# Patient Record
Sex: FEMALE | Race: WHITE | NOT HISPANIC OR LATINO | Employment: OTHER | URBAN - NONMETROPOLITAN AREA
[De-identification: names, ages, dates, MRNs, and addresses within clinical notes are randomized per-mention and may not be internally consistent; named-entity substitution may affect disease eponyms.]

---

## 2022-08-04 ENCOUNTER — APPOINTMENT (OUTPATIENT)
Dept: CT IMAGING | Facility: HOSPITAL | Age: 72
End: 2022-08-04

## 2022-08-04 ENCOUNTER — HOSPITAL ENCOUNTER (OUTPATIENT)
Facility: HOSPITAL | Age: 72
Discharge: HOME | End: 2022-08-05
Attending: EMERGENCY MEDICINE | Admitting: SURGERY

## 2022-08-04 DIAGNOSIS — K62.5 BRIGHT RED RECTAL BLEEDING: Primary | ICD-10-CM

## 2022-08-04 LAB
ABO GROUP BLD: NORMAL
ABO GROUP BLD: NORMAL
ALBUMIN SERPL-MCNC: 4 G/DL (ref 3.5–5.2)
ALBUMIN/GLOB SERPL: 1.4 G/DL
ALP SERPL-CCNC: 93 U/L (ref 39–117)
ALT SERPL W P-5'-P-CCNC: <5 U/L (ref 1–33)
ANION GAP SERPL CALCULATED.3IONS-SCNC: 12.6 MMOL/L (ref 5–15)
APTT PPP: 29.3 SECONDS (ref 70–100)
AST SERPL-CCNC: 10 U/L (ref 1–32)
BASOPHILS # BLD AUTO: 0.05 10*3/MM3 (ref 0–0.2)
BASOPHILS NFR BLD AUTO: 0.6 % (ref 0–1.5)
BILIRUB SERPL-MCNC: 0.2 MG/DL (ref 0–1.2)
BILIRUB UR QL STRIP: NEGATIVE
BLD GP AB SCN SERPL QL: NEGATIVE
BUN SERPL-MCNC: 39 MG/DL (ref 8–23)
BUN/CREAT SERPL: 13.3 (ref 7–25)
CALCIUM SPEC-SCNC: 9.1 MG/DL (ref 8.6–10.5)
CHLORIDE SERPL-SCNC: 102 MMOL/L (ref 98–107)
CLARITY UR: CLEAR
CO2 SERPL-SCNC: 22.4 MMOL/L (ref 22–29)
COLOR UR: YELLOW
CREAT SERPL-MCNC: 2.94 MG/DL (ref 0.57–1)
D-LACTATE SERPL-SCNC: 1 MMOL/L (ref 0.5–2)
DEPRECATED RDW RBC AUTO: 45.9 FL (ref 37–54)
EGFRCR SERPLBLD CKD-EPI 2021: 16.5 ML/MIN/1.73
EOSINOPHIL # BLD AUTO: 0.76 10*3/MM3 (ref 0–0.4)
EOSINOPHIL NFR BLD AUTO: 9.2 % (ref 0.3–6.2)
ERYTHROCYTE [DISTWIDTH] IN BLOOD BY AUTOMATED COUNT: 13.6 % (ref 12.3–15.4)
GLOBULIN UR ELPH-MCNC: 2.8 GM/DL
GLUCOSE BLDC GLUCOMTR-MCNC: 104 MG/DL (ref 70–130)
GLUCOSE BLDC GLUCOMTR-MCNC: 108 MG/DL (ref 70–130)
GLUCOSE SERPL-MCNC: 146 MG/DL (ref 65–99)
GLUCOSE UR STRIP-MCNC: NEGATIVE MG/DL
HCT VFR BLD AUTO: 35.3 % (ref 34–46.6)
HGB BLD-MCNC: 10.6 G/DL (ref 12–15.9)
HGB UR QL STRIP.AUTO: NEGATIVE
HOLD SPECIMEN: NORMAL
HOLD SPECIMEN: NORMAL
IMM GRANULOCYTES # BLD AUTO: 0.04 10*3/MM3 (ref 0–0.05)
IMM GRANULOCYTES NFR BLD AUTO: 0.5 % (ref 0–0.5)
INR PPP: 0.99 (ref 0.9–1.1)
KETONES UR QL STRIP: NEGATIVE
LEUKOCYTE ESTERASE UR QL STRIP.AUTO: NEGATIVE
LIPASE SERPL-CCNC: 47 U/L (ref 13–60)
LYMPHOCYTES # BLD AUTO: 2.72 10*3/MM3 (ref 0.7–3.1)
LYMPHOCYTES NFR BLD AUTO: 32.9 % (ref 19.6–45.3)
MCH RBC QN AUTO: 27.7 PG (ref 26.6–33)
MCHC RBC AUTO-ENTMCNC: 30 G/DL (ref 31.5–35.7)
MCV RBC AUTO: 92.2 FL (ref 79–97)
MONOCYTES # BLD AUTO: 0.77 10*3/MM3 (ref 0.1–0.9)
MONOCYTES NFR BLD AUTO: 9.3 % (ref 5–12)
NEUTROPHILS NFR BLD AUTO: 3.94 10*3/MM3 (ref 1.7–7)
NEUTROPHILS NFR BLD AUTO: 47.5 % (ref 42.7–76)
NITRITE UR QL STRIP: NEGATIVE
NRBC BLD AUTO-RTO: 0 /100 WBC (ref 0–0.2)
PH UR STRIP.AUTO: 7 [PH] (ref 5–8)
PLATELET # BLD AUTO: 175 10*3/MM3 (ref 140–450)
PMV BLD AUTO: 10.4 FL (ref 6–12)
POTASSIUM SERPL-SCNC: 4.9 MMOL/L (ref 3.5–5.2)
PROT SERPL-MCNC: 6.8 G/DL (ref 6–8.5)
PROT UR QL STRIP: NEGATIVE
PROTHROMBIN TIME: 13.4 SECONDS (ref 12.5–14.5)
RBC # BLD AUTO: 3.83 10*6/MM3 (ref 3.77–5.28)
RH BLD: POSITIVE
RH BLD: POSITIVE
SARS-COV-2 RNA PNL SPEC NAA+PROBE: NOT DETECTED
SODIUM SERPL-SCNC: 137 MMOL/L (ref 136–145)
SP GR UR STRIP: 1.01 (ref 1–1.03)
T&S EXPIRATION DATE: NORMAL
UROBILINOGEN UR QL STRIP: NORMAL
WBC NRBC COR # BLD: 8.28 10*3/MM3 (ref 3.4–10.8)
WHOLE BLOOD HOLD COAG: NORMAL
WHOLE BLOOD HOLD SPECIMEN: NORMAL

## 2022-08-04 PROCEDURE — 86900 BLOOD TYPING SEROLOGIC ABO: CPT

## 2022-08-04 PROCEDURE — 99284 EMERGENCY DEPT VISIT MOD MDM: CPT

## 2022-08-04 PROCEDURE — 80053 COMPREHEN METABOLIC PANEL: CPT | Performed by: EMERGENCY MEDICINE

## 2022-08-04 PROCEDURE — 82962 GLUCOSE BLOOD TEST: CPT

## 2022-08-04 PROCEDURE — 86901 BLOOD TYPING SEROLOGIC RH(D): CPT

## 2022-08-04 PROCEDURE — 86901 BLOOD TYPING SEROLOGIC RH(D): CPT | Performed by: NURSE PRACTITIONER

## 2022-08-04 PROCEDURE — 87635 SARS-COV-2 COVID-19 AMP PRB: CPT | Performed by: PHYSICIAN ASSISTANT

## 2022-08-04 PROCEDURE — 81003 URINALYSIS AUTO W/O SCOPE: CPT | Performed by: EMERGENCY MEDICINE

## 2022-08-04 PROCEDURE — 83605 ASSAY OF LACTIC ACID: CPT | Performed by: EMERGENCY MEDICINE

## 2022-08-04 PROCEDURE — 85730 THROMBOPLASTIN TIME PARTIAL: CPT | Performed by: PHYSICIAN ASSISTANT

## 2022-08-04 PROCEDURE — G0378 HOSPITAL OBSERVATION PER HR: HCPCS

## 2022-08-04 PROCEDURE — 93005 ELECTROCARDIOGRAM TRACING: CPT | Performed by: PHYSICIAN ASSISTANT

## 2022-08-04 PROCEDURE — 99204 OFFICE O/P NEW MOD 45 MIN: CPT | Performed by: SURGERY

## 2022-08-04 PROCEDURE — 83690 ASSAY OF LIPASE: CPT | Performed by: EMERGENCY MEDICINE

## 2022-08-04 PROCEDURE — 86850 RBC ANTIBODY SCREEN: CPT | Performed by: NURSE PRACTITIONER

## 2022-08-04 PROCEDURE — 85610 PROTHROMBIN TIME: CPT | Performed by: PHYSICIAN ASSISTANT

## 2022-08-04 PROCEDURE — C9803 HOPD COVID-19 SPEC COLLECT: HCPCS

## 2022-08-04 PROCEDURE — 85025 COMPLETE CBC W/AUTO DIFF WBC: CPT | Performed by: EMERGENCY MEDICINE

## 2022-08-04 PROCEDURE — 86900 BLOOD TYPING SEROLOGIC ABO: CPT | Performed by: NURSE PRACTITIONER

## 2022-08-04 PROCEDURE — 74176 CT ABD & PELVIS W/O CONTRAST: CPT

## 2022-08-04 PROCEDURE — 99218 PR INITIAL OBSERVATION CARE/DAY 30 MINUTES: CPT | Performed by: NURSE PRACTITIONER

## 2022-08-04 RX ORDER — ONDANSETRON 4 MG/1
4 TABLET, FILM COATED ORAL EVERY 6 HOURS PRN
Status: DISCONTINUED | OUTPATIENT
Start: 2022-08-04 | End: 2022-08-05 | Stop reason: HOSPADM

## 2022-08-04 RX ORDER — POLYETHYLENE GLYCOL 3350 17 G/17G
1 POWDER, FOR SOLUTION ORAL ONCE
Status: COMPLETED | OUTPATIENT
Start: 2022-08-04 | End: 2022-08-04

## 2022-08-04 RX ORDER — ONDANSETRON 2 MG/ML
4 INJECTION INTRAMUSCULAR; INTRAVENOUS EVERY 6 HOURS PRN
Status: DISCONTINUED | OUTPATIENT
Start: 2022-08-04 | End: 2022-08-05 | Stop reason: HOSPADM

## 2022-08-04 RX ORDER — DICYCLOMINE HYDROCHLORIDE 10 MG/1
10 CAPSULE ORAL
COMMUNITY

## 2022-08-04 RX ORDER — NICOTINE POLACRILEX 4 MG
15 LOZENGE BUCCAL
Status: DISCONTINUED | OUTPATIENT
Start: 2022-08-04 | End: 2022-08-05 | Stop reason: HOSPADM

## 2022-08-04 RX ORDER — SODIUM CHLORIDE 0.9 % (FLUSH) 0.9 %
10 SYRINGE (ML) INJECTION EVERY 12 HOURS SCHEDULED
Status: DISCONTINUED | OUTPATIENT
Start: 2022-08-04 | End: 2022-08-05 | Stop reason: HOSPADM

## 2022-08-04 RX ORDER — PANTOPRAZOLE SODIUM 40 MG/10ML
40 INJECTION, POWDER, LYOPHILIZED, FOR SOLUTION INTRAVENOUS
Status: DISCONTINUED | OUTPATIENT
Start: 2022-08-05 | End: 2022-08-05 | Stop reason: HOSPADM

## 2022-08-04 RX ORDER — LOPERAMIDE HYDROCHLORIDE 2 MG/1
2 CAPSULE ORAL 4 TIMES DAILY PRN
COMMUNITY

## 2022-08-04 RX ORDER — ATORVASTATIN CALCIUM 20 MG/1
20 TABLET, FILM COATED ORAL NIGHTLY
COMMUNITY

## 2022-08-04 RX ORDER — CHOLECALCIFEROL (VITAMIN D3) 125 MCG
5 CAPSULE ORAL NIGHTLY PRN
Status: DISCONTINUED | OUTPATIENT
Start: 2022-08-04 | End: 2022-08-05 | Stop reason: HOSPADM

## 2022-08-04 RX ORDER — CALCITRIOL 0.25 UG/1
0.25 CAPSULE, LIQUID FILLED ORAL DAILY
COMMUNITY

## 2022-08-04 RX ORDER — FUROSEMIDE 40 MG/1
40 TABLET ORAL DAILY
COMMUNITY

## 2022-08-04 RX ORDER — ACETAMINOPHEN 325 MG/1
650 TABLET ORAL EVERY 4 HOURS PRN
Status: DISCONTINUED | OUTPATIENT
Start: 2022-08-04 | End: 2022-08-05 | Stop reason: HOSPADM

## 2022-08-04 RX ORDER — SODIUM CHLORIDE 0.9 % (FLUSH) 0.9 %
10 SYRINGE (ML) INJECTION AS NEEDED
Status: DISCONTINUED | OUTPATIENT
Start: 2022-08-04 | End: 2022-08-05 | Stop reason: HOSPADM

## 2022-08-04 RX ORDER — INSULIN ASPART 100 [IU]/ML
0-14 INJECTION, SOLUTION INTRAVENOUS; SUBCUTANEOUS
Status: DISCONTINUED | OUTPATIENT
Start: 2022-08-04 | End: 2022-08-05 | Stop reason: HOSPADM

## 2022-08-04 RX ORDER — SIMETHICONE 125 MG
125 TABLET,CHEWABLE ORAL EVERY 6 HOURS PRN
COMMUNITY

## 2022-08-04 RX ORDER — CARVEDILOL 6.25 MG/1
6.25 TABLET ORAL 2 TIMES DAILY WITH MEALS
COMMUNITY

## 2022-08-04 RX ORDER — CITALOPRAM 40 MG/1
40 TABLET ORAL EVERY MORNING
COMMUNITY

## 2022-08-04 RX ORDER — DEXTROSE MONOHYDRATE 25 G/50ML
25 INJECTION, SOLUTION INTRAVENOUS
Status: DISCONTINUED | OUTPATIENT
Start: 2022-08-04 | End: 2022-08-05 | Stop reason: HOSPADM

## 2022-08-04 RX ORDER — HYDROXYZINE HYDROCHLORIDE 25 MG/1
25 TABLET, FILM COATED ORAL 2 TIMES DAILY PRN
COMMUNITY

## 2022-08-04 RX ORDER — OMEPRAZOLE 20 MG/1
20 CAPSULE, DELAYED RELEASE ORAL DAILY
COMMUNITY

## 2022-08-04 RX ADMIN — Medication 10 ML: at 21:28

## 2022-08-04 RX ADMIN — SODIUM CHLORIDE 500 ML: 9 INJECTION, SOLUTION INTRAVENOUS at 13:07

## 2022-08-04 RX ADMIN — ACETAMINOPHEN 650 MG: 325 TABLET ORAL at 18:56

## 2022-08-04 RX ADMIN — POLYETHYLENE GLYCOL 3350 1 BOTTLE: 17 POWDER, FOR SOLUTION ORAL at 21:26

## 2022-08-04 NOTE — ED PROVIDER NOTES
Subjective   Patient is a 72-year-old female with history of CHF, COPD, diabetes, hypertension, renal disorder, Parkinson's disease, and stroke on Plavix presenting to the ER for evaluation of rectal bleeding.  Patient reported she lives in [place] with her son but is visiting her daughter here in [place].  She states last night she began having bright red bleeding from her rectum.  She states that she has had some diffuse low back pain and cramping that feels like menstrual cramping in her lower stomach.  She states that she does believe she is on blood thinner, does not recall the name but spoke with patient's son/caregiver via telephone and he believed it was Plavix.  He states that she last had a colonoscopy approximately 1 year ago, had some polyps removed but no other abnormal findings. Daughter thought she had a colonoscopy earlier this year. Patient states she does take medicine to help with her bowel movements.  She somtimes has loose diarrhea and other times she will have hard balls of stool.  She states she does have a history of hemorrhoids.  She denies any dizziness, fever, chills, cough, shortness of breath, emesis, hematemesis, dysuria, hematuria, or any other symptoms.          Review of Systems   Constitutional: Negative for chills and fever.   HENT: Negative.    Eyes: Negative.    Respiratory: Negative.    Cardiovascular: Negative.    Gastrointestinal: Positive for abdominal pain and anal bleeding. Negative for nausea and vomiting.   Genitourinary: Negative.    Musculoskeletal: Negative.    Skin: Negative.    Neurological: Negative.    Psychiatric/Behavioral: Negative.        Past Medical History:   Diagnosis Date   • CHF (congestive heart failure) (HCC)    • COPD (chronic obstructive pulmonary disease) (HCC)    • Diabetes mellitus (HCC)    • Hypertension    • Renal disorder    • Stroke (HCC)        No Known Allergies    Past Surgical History:   Procedure Laterality Date   • COLONOSCOPY     •  "HYSTERECTOMY         History reviewed. No pertinent family history.    Social History     Socioeconomic History   • Marital status: Single   Tobacco Use   • Smoking status: Never Smoker   • Smokeless tobacco: Never Used   Vaping Use   • Vaping Use: Never used           Objective   Physical Exam  Vitals and nursing note reviewed.     /70   Pulse 62   Temp 97.8 °F (36.6 °C) (Oral)   Resp 18   Ht 152.4 cm (60\")   Wt 81.6 kg (180 lb)   SpO2 98%   BMI 35.15 kg/m²     GEN: No acute distress, sitting upright in stretcher.  Awake and alert.  Does not appear septic or toxic.  She is answering questions appropriately.  Head: Normocephalic, atraumatic  Eyes: EOM intact  ENT: Mask in place per protocol  Chest: Nontender to palpation  Cardiovascular: Regular rate and rhythm  Lungs: Clear to auscultation bilaterally without adventitious sounds  Abdomen: Nondistended.  Bowel sounds present.  Patient does have some tenderness in her lower abdomen with no specific focal guarding or rigidity.  Patient has bright red blood and clots in her briefs.  No external tears or lacerations.  No specific pain on rectal exam.  Extremities: No edema, normal appearance, full range of motion without deficits  Neuro: GCS 15  Psych: Mood and affect are appropriate    Procedures           ED Course  ED Course as of 08/04/22 1511   Thu Aug 04, 2022   1233 WBC: 8.28 [LA]   1233 Hemoglobin(!): 10.6 [LA]   1233 Platelets: 175 [LA]   1233 Lactate: 1.0 [LA]   1244 Glucose(!): 146 [LA]   1252 BUN(!): 39 [LA]   1252 Creatinine(!): 2.94 [LA]   1252 Sodium: 137 [LA]   1252 Potassium: 4.9 [LA]   1252 Chloride: 102 [LA]   1252 CO2: 22.4 [LA]   1252 Calcium: 9.1 [LA]   1252 Total Protein: 6.8 [LA]   1252 Albumin: 4.00 [LA]   1252 ALT (SGPT): <5 [LA]   1252 AST (SGOT): 10 [LA]   1252 Alkaline Phosphatase: 93 [LA]   1252 Total Bilirubin: 0.2 [LA]   1252 Globulin: 2.8 [LA]   1252 A/G Ratio: 1.4 [LA]   1252 BUN/Creatinine Ratio: 13.3 [LA]   1252 Anion " Gap: 12.6 [LA]   1252 eGFR(!): 16.5 [LA]   1252 Patient's creatinine appears similar to previous.  Her hemoglobin was last checked 4 months ago in our system 12.4 at that time. [LA]   1258 Patient in bathroom at this time. [LA]   1259 Protime: 13.4 [LA]   1259 INR: 0.99 [LA]   1259 PTT(!): 29.3 [LA]   1337 Narrative & Impression  CT of the abdomen and pelvis without contrast     INDICATION: Rectal bleeding, lower back pain     COMPARISON: None     TECHNIQUE: Helically acquired axial multidetector CT images were  obtained from the lung bases through the pelvis without IV contrast.  Dose reduction techniques to achieve ALARA were employed.      Findings:     Lung bases: [No focal pneumonia]. Mild cardiomegaly. Multivessel  coronary artery calcifications.     Liver: [Grossly unremarkable]     Spleen: [Unremarkable]     Gallbladder/biliary tree: [ No biliary ductal dilatation].  Cholecystectomy.     Pancreas: [Grossly unremarkable].     Adrenals: [Unremarkable]     Kidneys: [Punctate 2 to 3 mm nonobstructing left renal calculi. No  hydronephrosis].     GI: [No bowel obstruction]. Redundant colonic loops with extensive  colonic diverticulosis. No acute diverticulitis     Bladder: [Unremarkable].     Bones: [No significant osseous abnormalities are identified].     Soft Tissues: {unremarkable]. Small fat-containing right inguinal  hernia.     IMPRESSION:  No acute abdominopelvic pathology. Redundant colonic loops with  extensive colonic diverticulosis.     This report was signed and finalized on 8/4/2022 1:32 PM by Navdeep Calderon MD. [LA]   1353 Discussed with Dr. Christensen, he states he will follow along with the patient in consultation. [LA]   1414 COVID19: Not Detected [LA]   1417 Spoke with Dr. Rosenberg, agreed to admit patient to telemetry.  [LA]   1437 EKG interpreted by me at 1414, reveals sinus arrhythmia at a rate of 71 with left bundle branch block..  No ectopy, no ischemic changes.  Nonspecific T wave changes.  [PF]      ED Course User Index  [LA] Renetta Grey PA-C  [PF] Ghanshyam Tabor,       Lab Results (last 24 hours)     Procedure Component Value Units Date/Time    CBC & Differential [[id number]]  (Abnormal) Collected: 08/04/22 1211    Specimen: Blood Updated: 08/04/22 1220    Narrative:      The following orders were created for panel order CBC & Differential.  Procedure                               Abnormality         Status                     ---------                               -----------         ------                     CBC Auto Differential[[id number]]        Abnormal            Final result                 Please view results for these tests on the individual orders.    Comprehensive Metabolic Panel [[id number]]  (Abnormal) Collected: 08/04/22 1211    Specimen: Blood Updated: 08/04/22 1241     Glucose 146 mg/dL      BUN 39 mg/dL      Creatinine 2.94 mg/dL      Sodium 137 mmol/L      Potassium 4.9 mmol/L      Comment: Slight hemolysis detected by analyzer. Results may be affected.        Chloride 102 mmol/L      CO2 22.4 mmol/L      Calcium 9.1 mg/dL      Total Protein 6.8 g/dL      Albumin 4.00 g/dL      ALT (SGPT) <5 U/L      AST (SGOT) 10 U/L      Alkaline Phosphatase 93 U/L      Total Bilirubin 0.2 mg/dL      Globulin 2.8 gm/dL      A/G Ratio 1.4 g/dL      BUN/Creatinine Ratio 13.3     Anion Gap 12.6 mmol/L      eGFR 16.5 mL/min/1.73      Comment: National Kidney Foundation and American Society of Nephrology (ASN) Task Force recommended calculation based on the Chronic Kidney Disease Epidemiology Collaboration (CKD-EPI) equation refit without adjustment for race.       Narrative:      GFR Normal >60  Chronic Kidney Disease <60  Kidney Failure <15      Lipase [[id number]]  (Normal) Collected: 08/04/22 1211    Specimen: Blood Updated: 08/04/22 1236     Lipase 47 U/L     Lactic Acid, Plasma [[id number]]  (Normal) Collected: 08/04/22 1211    Specimen: Blood Updated: 08/04/22 1233     Lactate 1.0 mmol/L      CBC Auto Differential [[id number]]  (Abnormal) Collected: 08/04/22 1211    Specimen: Blood Updated: 08/04/22 1220     WBC 8.28 10*3/mm3      RBC 3.83 10*6/mm3      Hemoglobin 10.6 g/dL      Hematocrit 35.3 %      MCV 92.2 fL      MCH 27.7 pg      MCHC 30.0 g/dL      RDW 13.6 %      RDW-SD 45.9 fl      MPV 10.4 fL      Platelets 175 10*3/mm3      Neutrophil % 47.5 %      Lymphocyte % 32.9 %      Monocyte % 9.3 %      Eosinophil % 9.2 %      Basophil % 0.6 %      Immature Grans % 0.5 %      Neutrophils, Absolute 3.94 10*3/mm3      Lymphocytes, Absolute 2.72 10*3/mm3      Monocytes, Absolute 0.77 10*3/mm3      Eosinophils, Absolute 0.76 10*3/mm3      Basophils, Absolute 0.05 10*3/mm3      Immature Grans, Absolute 0.04 10*3/mm3      nRBC 0.0 /100 WBC     Protime-INR [[id number]]  (Normal) Collected: 08/04/22 1211    Specimen: Blood Updated: 08/04/22 1258     Protime 13.4 Seconds      INR 0.99    Narrative:      Suggested INR therapeutic range for stable oral anticoagulant therapy:    Low Intensity therapy:   1.5-2.0  Moderate Intensity therapy:   2.0-3.0  High Intensity therapy:   2.5-4.0    aPTT [[id number]]  (Abnormal) Collected: 08/04/22 1211    Specimen: Blood Updated: 08/04/22 1258     PTT 29.3 seconds     COVID PRE-OP / PRE-PROCEDURE SCREENING ORDER (NO ISOLATION) - Swab, Nasal Cavity [[id number]]  (Normal) Collected: 08/04/22 1338    Specimen: Swab from Nasal Cavity Updated: 08/04/22 1413    Narrative:      The following orders were created for panel order COVID PRE-OP / PRE-PROCEDURE SCREENING ORDER (NO ISOLATION) - Swab, Nasal Cavity.  Procedure                               Abnormality         Status                     ---------                               -----------         ------                     COVID-19,[facility] IN-PILO...[[id number]]  Normal              Final result                 Please view results for these tests on the individual orders.    COVID-19,[facility] IN-HOUSE,Nasal Swab No Transport  "Media 3-4 HR TAT - Swab, Nasal Cavity [[id number]]  (Normal) Collected: 08/04/22 1338    Specimen: Swab from Nasal Cavity Updated: 08/04/22 1413     COVID19 Not Detected    Narrative:      Fact sheet for providers: https://www.fda.gov/media/593951/download     Fact sheet for patients: https://www.fda.gov/media/229224/download    Test performed by PCR.    Consider negative results in combination with clinical observations, patient history, and epidemiological information.    Urinalysis With Microscopic If Indicated (No Culture) - Urine, Clean Catch [[id number]]  (Normal) Collected: 08/04/22 1434    Specimen: Urine, Clean Catch Updated: 08/04/22 1445     Color, UA Yellow     Appearance, UA Clear     pH, UA 7.0     Specific Gravity, UA 1.006     Glucose, UA Negative     Ketones, UA Negative     Bilirubin, UA Negative     Blood, UA Negative     Protein, UA Negative     Leuk Esterase, UA Negative     Nitrite, UA Negative     Urobilinogen, UA 0.2 E.U./dL    Narrative:      Urine microscopic not indicated.        /70   Pulse 62   Temp 97.8 °F (36.6 °C) (Oral)   Resp 18   Ht 152.4 cm (60\")   Wt 81.6 kg (180 lb)   SpO2 98%   BMI 35.15 kg/m²     CT Abdomen Pelvis Without Contrast    Result Date: 8/4/2022  CT of the abdomen and pelvis without contrast  INDICATION: Rectal bleeding, lower back pain  COMPARISON: None  TECHNIQUE: Helically acquired axial multidetector CT images were obtained from the lung bases through the pelvis without IV contrast. Dose reduction techniques to achieve ALARA were employed.  Findings:  Lung bases: [No focal pneumonia]. Mild cardiomegaly. Multivessel coronary artery calcifications.  Liver: [Grossly unremarkable]  Spleen: [Unremarkable]  Gallbladder/biliary tree: [ No biliary ductal dilatation]. Cholecystectomy.  Pancreas: [Grossly unremarkable].  Adrenals: [Unremarkable]  Kidneys: [Punctate 2 to 3 mm nonobstructing left renal calculi. No hydronephrosis].  GI: [No bowel obstruction]. " Redundant colonic loops with extensive colonic diverticulosis. No acute diverticulitis  Bladder: [Unremarkable].  Bones: [No significant osseous abnormalities are identified].  Soft Tissues: {unremarkable]. Small fat-containing right inguinal hernia.      Impression: No acute abdominopelvic pathology. Redundant colonic loops with extensive colonic diverticulosis.  This report was signed and finalized on 8/4/2022 1:32 PM by Navdeep Calderon MD.                                         MDM  Number of Diagnoses or Management Options  Bright red rectal bleeding  Diagnosis management comments: On arrival, patient is stable.  Differential could include hemorrhoid, colitis, colon polyps, mass or neoplasm, anemia.  Will obtain basic labs, coags, urinalysis.  Will likely obtain CT of abdomen pelvis to evaluate.    Patient's labs revealed  hemoglobin of 10.6 down from 12.4 4 months ago.  She does have a creatinine of 2.94 today, it was 2.69 1 month ago.  No other significant electrolyte abnormalities were noted.  Urine had no signs of infection.  CT scan was read by the radiologist with diverticulosis without other acute findings.  Patient had obvious rectal bleeding in the toilet and on rectal exam.  There did not appear to be any external bleeding.  There is no discomfort on rectal exam.  I did review patient's medications that were in the bag at bedside, there was no Plavix or anticoagulation.  We called her son once again, he states there was some medications that they took her off of at one point but he is unsure the name.  He states he will try to find out.  Discussed with Dr. Christensen who was in agreement to consult on the patient.  Discussed with Dr. Bergeron who agreed to admit the patient for observation.       Amount and/or Complexity of Data Reviewed  Clinical lab tests: reviewed and ordered  Tests in the radiology section of CPT®: reviewed and ordered  Tests in the medicine section of CPT®: reviewed  Discussion of  test results with the performing providers: yes  Decide to obtain previous medical records or to obtain history from someone other than the patient: yes  Review and summarize past medical records: yes  Discuss the patient with other providers: yes    Risk of Complications, Morbidity, and/or Mortality  Presenting problems: moderate  Diagnostic procedures: moderate  Management options: moderate    Patient Progress  Patient progress: stable      Final diagnoses:   Bright red rectal bleeding       ED Disposition  ED Disposition     ED Disposition   Decision to Admit    Condition   --    Comment   Level of Care: Med/Surg [1]   Diagnosis: Bright red rectal bleeding [[id number]]   Admitting Physician: SHERRI SÁNCHEZ [[id number]]   Attending Physician: SHERRI SÁNCHEZ [[id number]]               No follow-up provider specified.       Medication List      No changes were made to your prescriptions during this visit.          Renetta Grey PA-C  08/04/22 1513

## 2022-08-04 NOTE — H&P
[facility] HOSPITALIST   HISTORY AND PHYSICAL      Name:  Milagros Gil   Age:  72 y.o.  Sex:  female  :  1950  MRN:  [id number]   Visit Number:  [id number]  Admission Date:  2022  Date Of Service:  22  Primary Care Physician:  Provider, No Known    Chief Complaint:     Rectal Bleeding    History Of Presenting Illness:      Patient is a 72 year old female who presents to the hospital with family today with complaints of rectal bleeding.  Patient is here visiting from [place] and staying with daughter.  Patient stated that last evening she started having bright red bleeding from her rectum with associated symptoms including cramping in lower abdomen.  Patient denies any pain currently but states abdomen has some discomfort with palpation.  States her bowel movements vary in consistency.  Patient with history of hemorrhoids with most recent colonoscopy one year ago with polyps removed and nothing else noted to be abnormal. Denies dizziness, fever, shortness of breath, dysuria, or recent sick contacts.  Patient with Plavix noted on medication list.  Patient with past health history significant for COPD, combined systolic/diastolic heart failure, hypertension, CKD stage 4, Parkinsons disease and history of CVA.  Patient lives with her son in [place] and uses a walker at baseline.    Work up in the Emergency Department noted glucose-146, creatinine-2.9, BUN-39, hemoglobin-10.6 down from 12.4 in March of this year.  CT Abd/Pelvis noted no acute pathology and redundant colonic loops with extensive colonic diverticulosis.  Urinalysis was normal.  Covid 19 was negative.  Patient was given normal saline bolus 500ml and hospitalist was contacted for admission due to rectal bleeding with general surgery consulting ([facility]).    Review Of Systems:    All systems were reviewed and negative except as mentioned in history of presenting illness, assessment and plan.    Past Medical History: Patient   "has a past medical history of CHF (congestive heart failure) (HCC), COPD (chronic obstructive pulmonary disease) (HCC), Diabetes mellitus (HCC), Hypertension, Renal disorder, and Stroke (HCC).    Past Surgical History: Patient  has a past surgical history that includes Colonoscopy and Hysterectomy.    Social History: Patient      Family History: Patient's family history is not on file.    Allergies:      Patient has no known allergies.    Home Medications:    Prior to Admission Medications     None        ED Medications:    Medications   sodium chloride 0.9 % flush 10 mL (has no administration in time range)   sodium chloride 0.9 % bolus 500 mL (500 mL Intravenous New Bag 8/4/22 1307)     Vital Signs:  Temp:  [97.8 °F (36.6 °C)] 97.8 °F (36.6 °C)  Heart Rate:  [62-65] 62  Resp:  [18] 18  BP: (142-155)/(70-74) 142/70        08/04/22  1206   Weight: 81.6 kg (180 lb)     Body mass index is 35.15 kg/m².    Physical Exam:     Most recent vital Signs: /70   Pulse 62   Temp 97.8 °F (36.6 °C) (Oral)   Resp 18   Ht 152.4 cm (60\")   Wt 81.6 kg (180 lb)   SpO2 98%   BMI 35.15 kg/m²     Physical Exam  Vitals and nursing note reviewed.   Constitutional:       General: She is not in acute distress.     Appearance: Normal appearance. She is not toxic-appearing.   HENT:      Head: Normocephalic and atraumatic.      Mouth/Throat:      Mouth: Mucous membranes are moist.   Eyes:      Pupils: Pupils are equal, round, and reactive to light.   Cardiovascular:      Rate and Rhythm: Normal rate and regular rhythm.      Pulses: Normal pulses.      Heart sounds: Normal heart sounds.   Pulmonary:      Effort: Pulmonary effort is normal.      Breath sounds: Normal breath sounds.   Abdominal:      General: Bowel sounds are normal. There is no distension.      Palpations: Abdomen is soft.      Tenderness: There is abdominal tenderness (mild diffuse tenderness).      Comments: Red blood clots to depends   Musculoskeletal:         " General: Normal range of motion.   Skin:     General: Skin is warm and dry.   Neurological:      General: No focal deficit present.      Mental Status: She is alert and oriented to person, place, and time. Mental status is at baseline.   Psychiatric:         Mood and Affect: Mood normal.         Behavior: Behavior normal.         Thought Content: Thought content normal.         Laboratory data:    I have reviewed the labs done in the emergency room.    Results from last 7 days   Lab Units 08/04/22  1211   SODIUM mmol/L 137   POTASSIUM mmol/L 4.9   CHLORIDE mmol/L 102   CO2 mmol/L 22.4   BUN mg/dL 39*   CREATININE mg/dL 2.94*   CALCIUM mg/dL 9.1   BILIRUBIN mg/dL 0.2   ALK PHOS U/L 93   ALT (SGPT) U/L <5   AST (SGOT) U/L 10   GLUCOSE mg/dL 146*     Results from last 7 days   Lab Units 08/04/22  1211   WBC 10*3/mm3 8.28   HEMOGLOBIN g/dL 10.6*   HEMATOCRIT % 35.3   PLATELETS 10*3/mm3 175     Results from last 7 days   Lab Units 08/04/22  1211   INR  0.99                 Results from last 7 days   Lab Units 08/04/22  1211   LIPASE U/L 47               Invalid input(s): USDES,  BLOODU, NITRITITE, BACT, EP    Pain Management Panel    There is no flowsheet data to display.        Radiology:    CT Abdomen Pelvis Without Contrast    Result Date: 8/4/2022  CT of the abdomen and pelvis without contrast  INDICATION: Rectal bleeding, lower back pain  COMPARISON: None  TECHNIQUE: Helically acquired axial multidetector CT images were obtained from the lung bases through the pelvis without IV contrast. Dose reduction techniques to achieve ALARA were employed.  Findings:  Lung bases: [No focal pneumonia]. Mild cardiomegaly. Multivessel coronary artery calcifications.  Liver: [Grossly unremarkable]  Spleen: [Unremarkable]  Gallbladder/biliary tree: [ No biliary ductal dilatation]. Cholecystectomy.  Pancreas: [Grossly unremarkable].  Adrenals: [Unremarkable]  Kidneys: [Punctate 2 to 3 mm nonobstructing left renal calculi. No  hydronephrosis].  GI: [No bowel obstruction]. Redundant colonic loops with extensive colonic diverticulosis. No acute diverticulitis  Bladder: [Unremarkable].  Bones: [No significant osseous abnormalities are identified].  Soft Tissues: {unremarkable]. Small fat-containing right inguinal hernia.      No acute abdominopelvic pathology. Redundant colonic loops with extensive colonic diverticulosis.  This report was signed and finalized on 8/4/2022 1:32 PM by Navdeep Calderon MD.      Assessment:    1. Acute Rectal Bleeding, POA  2. Stage 4 Chronic Kidney Disease  3. Hypertension  4. Parkinsons Disease  5. Type 2 Diabetes Mellitus on insulin therapy  6. COPD not in exacerbation  7. History of CVA  8. Combined diastolic and systolic heart failure    Plan:    Admit patient to telemetry floor for further treatment    Acute Rectal Bleeding  -Monitor telemetry  -H/H at 2000 today  -Clear liquid diet for now;  NPO after midnight for possible intervention   -Consult General Surgery ([facility]) for recommendations  -Hemoccult  -Hold all anticoagulation including home Plavix  -Type and Screen  -Transfuse if hemoglobin <7  -SCDs for DVT prophylaxis  -Will hold on IV fluids given CKD  -Recheck basic labs in am  -Protonix IV    Chronic--COPD/ CHF/HTN/ CKD/ Diabetes  -Fingersticks AC/HS  -Sliding Scale Coverage for now  -Home medications after reconciliation    Patient is a full code on admission    Risk Assessment: Moderate due to age and co-morbidities  DVT Prophylaxis: SCDs  Code Status: Full Code  Diet: Clears now/ NPO with sips for meds after midnight    Advance Care Planning   ACP discussion was held with the patient during this visit. Patient has an advance directive (not in EMR), copy requested.       Francoise Delacruz, YASMIN  08/04/22  14:28 EDT    Dictated utilizing Dragon dictation.

## 2022-08-04 NOTE — PLAN OF CARE
Goal Outcome Evaluation:  Plan of Care Reviewed With: patient           Outcome Evaluation: Patient admitted to room 325. Patient seen by MD Christensen at bedside. NPO at midnight for possible procedure.

## 2022-08-04 NOTE — CONSULTS
Inpatient General Surgery Consult  Consult performed by: Kaz Christensen MD  Consult ordered by: Francoise Delacruz APRN            Referring Provider: No ref. provider found    Reason for Consultation: GI bleed    Patient Care Team:  Provider, No Known as PCP - General    Chief complaint   Chief Complaint   Patient presents with   • Rectal Bleeding       SUBJECTIVE:    History of present illness: Patient with onset of rectal bleeding which started last evening.  This persisted and continued to have episodes of bright red blood per rectum today.  She denies any significant constipation or diarrhea but has had some lower abdominal cramping.  She has been admitted for treatment and further evaluation.    Review of Systems:    Review of Systems - General ROS: negative for - chills, fatigue, fever, hot flashes, malaise or night sweats  Psychological ROS: negative for - Depression or anxiety  HEENT ROS: negative for -  No nasal/oral pharynx drainage or pain. No acute visual complaints.  Respiratory ROS: negative for - Shortness of breath, cough or hemoptysis.  Cardiovascular ROS: negative for - Chest pain or palpitations. No edema  Gastrointestinal ROS: As per HPI  Genito-Urinary ROS: negative for - dysuria or hematuria  Musculoskeletal ROS: negative for - gait disturbance or muscle pain  Neurological ROS: negative for - dizziness, gait disturbance, memory loss, numbness/tingling or seizures  Skin: no rash    History  Past Medical History:   Diagnosis Date   • CHF (congestive heart failure) (HCC)    • COPD (chronic obstructive pulmonary disease) (HCC)    • Diabetes mellitus (HCC)    • Hypertension    • Renal disorder    • Stroke (HCC)        Past Surgical History:   Procedure Laterality Date   • COLONOSCOPY     • HYSTERECTOMY         History reviewed. No pertinent family history.    Social History     Socioeconomic History   • Marital status: Single   Tobacco Use   • Smoking status: Never Smoker   • Smokeless tobacco:  Never Used   Vaping Use   • Vaping Use: Never used       No Known Allergies    OBJECTIVE:    Medications  Current Facility-Administered Medications   Medication Dose Route Frequency Provider Last Rate Last Admin   • acetaminophen (TYLENOL) tablet 650 mg  650 mg Oral Q4H PRN Francoise Delacruz APRN   650 mg at 08/04/22 1856   • dextrose (D50W) (25 g/50 mL) IV injection 25 g  25 g Intravenous Q15 Min PRN Francoise Delacruz APRLUCRECIA       • dextrose (GLUTOSE) oral gel 15 g  15 g Oral Q15 Min PRN Francoise Delacruz, APRN       • glucagon (human recombinant) (GLUCAGEN DIAGNOSTIC) injection 1 mg  1 mg Intramuscular Q15 Min PRN Francoise Delacruz APRLUCRECIA       • Insulin Aspart (novoLOG) injection 0-14 Units  0-14 Units Subcutaneous TID AC Francoise Delacruz APRLUCRECIA       • melatonin tablet 5 mg  5 mg Oral Nightly PRN Francoise Delacruz APRLUCRECIA       • ondansetron (ZOFRAN) tablet 4 mg  4 mg Oral Q6H PRN Francoise Delacruz, YASMIN        Or   • ondansetron (ZOFRAN) injection 4 mg  4 mg Intravenous Q6H PRN Francoise Delacruz APRLUCRECIA       • [START ON 8/5/2022] pantoprazole (PROTONIX) injection 40 mg  40 mg Intravenous Q AM Francoise Delacruz APRN       • polyethylene glycol (MIRALAX) powder 1 bottle  1 bottle Oral Once Kaz Christensen MD       • sodium chloride 0.9 % flush 10 mL  10 mL Intravenous PRN Natalia Santos MD       • sodium chloride 0.9 % flush 10 mL  10 mL Intravenous Q12H Francoise Delacruz, APRLUCRECIA             Vital Signs   Temp:  [97.4 °F (36.3 °C)-97.8 °F (36.6 °C)] 97.4 °F (36.3 °C)  Heart Rate:  [62-68] 68  Resp:  [16-18] 16  BP: (142-155)/(70-74) 154/71    Physical Exam:     General Appearance:  Alert and cooperative, not in any acute distress.   Head:  Atraumatic and normocephalic, without obvious abnormality.   Eyes:          PERRLA, conjunctivae and sclerae normal, no Icterus. No pallor. Extraocular movements are within normal limits.   Ears:  Ears appear intact with no abnormalities noted.   Respiratory/Lungs:    Breath sounds heard bilaterally equally.  No crackles or wheezing. No Pleural rub or bronchial breathing. Normal respiratory effort.    Cardiovascular/Heart:  Normal S1 and S2, no murmur. No edema   GI/Abdomen:    Currently nontender.  No hepatosplenomegaly.  Soft and nondistended              Musculoskeletal/ Extremities:   Moves all extremities well   Skin: No bleeding, bruising or rash, no induration   Psychiatric : Alert and oriented ×3.  No depression or anxiety    Neurologic: Cranial nerves 2 - 12 grossly intact, sensation intact, Motor power is normal and equal bilaterally.     Results Review:  Lab Results (last 24 hours)     Procedure Component Value Units Date/Time    POC Glucose Once [[id number]]  (Normal) Collected: 08/04/22 1818    Specimen: Blood Updated: 08/04/22 1820     Glucose 108 mg/dL      Comment: Serial Number: [id number]:  [id number]       Urinalysis With Microscopic If Indicated (No Culture) - Urine, Clean Catch [[id number]]  (Normal) Collected: 08/04/22 1434    Specimen: Urine, Clean Catch Updated: 08/04/22 1445     Color, UA Yellow     Appearance, UA Clear     pH, UA 7.0     Specific Gravity, UA 1.006     Glucose, UA Negative     Ketones, UA Negative     Bilirubin, UA Negative     Blood, UA Negative     Protein, UA Negative     Leuk Esterase, UA Negative     Nitrite, UA Negative     Urobilinogen, UA 0.2 E.U./dL    Narrative:      Urine microscopic not indicated.    COVID PRE-OP / PRE-PROCEDURE SCREENING ORDER (NO ISOLATION) - Swab, Nasal Cavity [[id number]]  (Normal) Collected: 08/04/22 1338    Specimen: Swab from Nasal Cavity Updated: 08/04/22 1413    Narrative:      The following orders were created for panel order COVID PRE-OP / PRE-PROCEDURE SCREENING ORDER (NO ISOLATION) - Swab, Nasal Cavity.  Procedure                               Abnormality         Status                     ---------                               -----------         ------                     COVID-19,[facility]  IN-PILO...[[id number]]  Normal              Final result                 Please view results for these tests on the individual orders.    COVID-19,[facility] IN-HOUSE,Nasal Swab No Transport Media 3-4 HR TAT - Swab, Nasal Cavity [[id number]]  (Normal) Collected: 08/04/22 1338    Specimen: Swab from Nasal Cavity Updated: 08/04/22 1413     COVID19 Not Detected    Narrative:      Fact sheet for providers: https://www.fda.gov/media/164905/download     Fact sheet for patients: https://www.fda.gov/media/939093/download    Test performed by PCR.    Consider negative results in combination with clinical observations, patient history, and epidemiological information.    [place] Draw [[id number]] Collected: 08/04/22 1211    Specimen: Blood Updated: 08/04/22 1318    Narrative:      The following orders were created for panel order [place] Draw.  Procedure                               Abnormality         Status                     ---------                               -----------         ------                     Green Top (Gel)[[id number]]                                  Final result               Lavender Top[[id number]]                                     Final result               Gold Top - SST[[id number]]                                   Final result               Light Blue Top[[id number]]                                   Final result                 Please view results for these tests on the individual orders.    Lavender Top [[id number]] Collected: 08/04/22 1211    Specimen: Blood Updated: 08/04/22 1318     Extra Tube hold for add-on     Comment: Auto resulted       Green Top (Gel) [[id number]] Collected: 08/04/22 1211    Specimen: Blood Updated: 08/04/22 1318     Extra Tube Hold for add-ons.     Comment: Auto resulted.       Gold Top - SST [[id number]] Collected: 08/04/22 1211    Specimen: Blood Updated: 08/04/22 1318     Extra Tube Hold for add-ons.     Comment: Auto resulted.       Light Blue Top [[id number]] Collected:  08/04/22 1211    Specimen: Blood Updated: 08/04/22 1318     Extra Tube Hold for add-ons.     Comment: Auto resulted       Protime-INR [[id number]]  (Normal) Collected: 08/04/22 1211    Specimen: Blood Updated: 08/04/22 1258     Protime 13.4 Seconds      INR 0.99    Narrative:      Suggested INR therapeutic range for stable oral anticoagulant therapy:    Low Intensity therapy:   1.5-2.0  Moderate Intensity therapy:   2.0-3.0  High Intensity therapy:   2.5-4.0    aPTT [[id number]]  (Abnormal) Collected: 08/04/22 1211    Specimen: Blood Updated: 08/04/22 1258     PTT 29.3 seconds     Comprehensive Metabolic Panel [[id number]]  (Abnormal) Collected: 08/04/22 1211    Specimen: Blood Updated: 08/04/22 1241     Glucose 146 mg/dL      BUN 39 mg/dL      Creatinine 2.94 mg/dL      Sodium 137 mmol/L      Potassium 4.9 mmol/L      Comment: Slight hemolysis detected by analyzer. Results may be affected.        Chloride 102 mmol/L      CO2 22.4 mmol/L      Calcium 9.1 mg/dL      Total Protein 6.8 g/dL      Albumin 4.00 g/dL      ALT (SGPT) <5 U/L      AST (SGOT) 10 U/L      Alkaline Phosphatase 93 U/L      Total Bilirubin 0.2 mg/dL      Globulin 2.8 gm/dL      A/G Ratio 1.4 g/dL      BUN/Creatinine Ratio 13.3     Anion Gap 12.6 mmol/L      eGFR 16.5 mL/min/1.73      Comment: National Kidney Foundation and American Society of Nephrology (ASN) Task Force recommended calculation based on the Chronic Kidney Disease Epidemiology Collaboration (CKD-EPI) equation refit without adjustment for race.       Narrative:      GFR Normal >60  Chronic Kidney Disease <60  Kidney Failure <15      Lipase [[id number]]  (Normal) Collected: 08/04/22 1211    Specimen: Blood Updated: 08/04/22 1236     Lipase 47 U/L     Lactic Acid, Plasma [[id number]]  (Normal) Collected: 08/04/22 1211    Specimen: Blood Updated: 08/04/22 1233     Lactate 1.0 mmol/L     CBC & Differential [[id number]]  (Abnormal) Collected: 08/04/22 1211    Specimen: Blood Updated: 08/04/22  1220    Narrative:      The following orders were created for panel order CBC & Differential.  Procedure                               Abnormality         Status                     ---------                               -----------         ------                     CBC Auto Differential[[id number]]        Abnormal            Final result                 Please view results for these tests on the individual orders.    CBC Auto Differential [[id number]]  (Abnormal) Collected: 08/04/22 1211    Specimen: Blood Updated: 08/04/22 1220     WBC 8.28 10*3/mm3      RBC 3.83 10*6/mm3      Hemoglobin 10.6 g/dL      Hematocrit 35.3 %      MCV 92.2 fL      MCH 27.7 pg      MCHC 30.0 g/dL      RDW 13.6 %      RDW-SD 45.9 fl      MPV 10.4 fL      Platelets 175 10*3/mm3      Neutrophil % 47.5 %      Lymphocyte % 32.9 %      Monocyte % 9.3 %      Eosinophil % 9.2 %      Basophil % 0.6 %      Immature Grans % 0.5 %      Neutrophils, Absolute 3.94 10*3/mm3      Lymphocytes, Absolute 2.72 10*3/mm3      Monocytes, Absolute 0.77 10*3/mm3      Eosinophils, Absolute 0.76 10*3/mm3      Basophils, Absolute 0.05 10*3/mm3      Immature Grans, Absolute 0.04 10*3/mm3      nRBC 0.0 /100 WBC           ASSESSMENT/PLAN:      Bright red rectal bleeding      Patient with lower GI bleeding of uncertain etiology.  Hemorrhoids, diverticulosis etc. are in the differential.  I offered a colonoscopy to the patient to further evaluate.  Of note, patient had a colonoscopy about a year ago due to apparent diarrhea at the time.  I explained the procedure to the patient as well as the risks of bleeding and perforation and they understand the ramifications of these potential complications and at this time she wishes to proceed for evaluation.  Currently the patient is stable.    Kaz Christensen MD  08/04/22  19:03 EDT

## 2022-08-05 ENCOUNTER — ANESTHESIA EVENT (OUTPATIENT)
Dept: GASTROENTEROLOGY | Facility: HOSPITAL | Age: 72
End: 2022-08-05

## 2022-08-05 ENCOUNTER — ANESTHESIA (OUTPATIENT)
Dept: GASTROENTEROLOGY | Facility: HOSPITAL | Age: 72
End: 2022-08-05

## 2022-08-05 VITALS
HEART RATE: 67 BPM | TEMPERATURE: 98.9 F | OXYGEN SATURATION: 92 % | WEIGHT: 180.34 LBS | HEIGHT: 60 IN | BODY MASS INDEX: 35.41 KG/M2 | SYSTOLIC BLOOD PRESSURE: 151 MMHG | RESPIRATION RATE: 16 BRPM | DIASTOLIC BLOOD PRESSURE: 66 MMHG

## 2022-08-05 LAB
ALBUMIN SERPL-MCNC: 3.9 G/DL (ref 3.5–5.2)
ALBUMIN/GLOB SERPL: 1.5 G/DL
ALP SERPL-CCNC: 75 U/L (ref 39–117)
ALT SERPL W P-5'-P-CCNC: <5 U/L (ref 1–33)
ANION GAP SERPL CALCULATED.3IONS-SCNC: 13 MMOL/L (ref 5–15)
AST SERPL-CCNC: 12 U/L (ref 1–32)
BASOPHILS # BLD AUTO: 0.04 10*3/MM3 (ref 0–0.2)
BASOPHILS NFR BLD AUTO: 0.6 % (ref 0–1.5)
BILIRUB SERPL-MCNC: 0.3 MG/DL (ref 0–1.2)
BUN SERPL-MCNC: 41 MG/DL (ref 8–23)
BUN/CREAT SERPL: 16.5 (ref 7–25)
CALCIUM SPEC-SCNC: 9.1 MG/DL (ref 8.6–10.5)
CHLORIDE SERPL-SCNC: 108 MMOL/L (ref 98–107)
CO2 SERPL-SCNC: 21 MMOL/L (ref 22–29)
CREAT SERPL-MCNC: 2.49 MG/DL (ref 0.57–1)
DEPRECATED RDW RBC AUTO: 43.9 FL (ref 37–54)
EGFRCR SERPLBLD CKD-EPI 2021: 20.2 ML/MIN/1.73
EOSINOPHIL # BLD AUTO: 0.57 10*3/MM3 (ref 0–0.4)
EOSINOPHIL NFR BLD AUTO: 8.2 % (ref 0.3–6.2)
ERYTHROCYTE [DISTWIDTH] IN BLOOD BY AUTOMATED COUNT: 13.4 % (ref 12.3–15.4)
GLOBULIN UR ELPH-MCNC: 2.6 GM/DL
GLUCOSE BLDC GLUCOMTR-MCNC: 101 MG/DL (ref 70–130)
GLUCOSE BLDC GLUCOMTR-MCNC: 87 MG/DL (ref 70–130)
GLUCOSE BLDC GLUCOMTR-MCNC: 89 MG/DL (ref 70–130)
GLUCOSE SERPL-MCNC: 101 MG/DL (ref 65–99)
HCT VFR BLD AUTO: 35.4 % (ref 34–46.6)
HEMOCCULT STL QL: NEGATIVE
HGB BLD-MCNC: 10.7 G/DL (ref 12–15.9)
IMM GRANULOCYTES # BLD AUTO: 0.03 10*3/MM3 (ref 0–0.05)
IMM GRANULOCYTES NFR BLD AUTO: 0.4 % (ref 0–0.5)
LYMPHOCYTES # BLD AUTO: 2.2 10*3/MM3 (ref 0.7–3.1)
LYMPHOCYTES NFR BLD AUTO: 31.5 % (ref 19.6–45.3)
MCH RBC QN AUTO: 27.4 PG (ref 26.6–33)
MCHC RBC AUTO-ENTMCNC: 30.2 G/DL (ref 31.5–35.7)
MCV RBC AUTO: 90.8 FL (ref 79–97)
MONOCYTES # BLD AUTO: 0.58 10*3/MM3 (ref 0.1–0.9)
MONOCYTES NFR BLD AUTO: 8.3 % (ref 5–12)
NEUTROPHILS NFR BLD AUTO: 3.56 10*3/MM3 (ref 1.7–7)
NEUTROPHILS NFR BLD AUTO: 51 % (ref 42.7–76)
NRBC BLD AUTO-RTO: 0 /100 WBC (ref 0–0.2)
PLATELET # BLD AUTO: 156 10*3/MM3 (ref 140–450)
PMV BLD AUTO: 10.4 FL (ref 6–12)
POTASSIUM SERPL-SCNC: 4.6 MMOL/L (ref 3.5–5.2)
PROT SERPL-MCNC: 6.5 G/DL (ref 6–8.5)
RBC # BLD AUTO: 3.9 10*6/MM3 (ref 3.77–5.28)
SODIUM SERPL-SCNC: 142 MMOL/L (ref 136–145)
WBC NRBC COR # BLD: 6.98 10*3/MM3 (ref 3.4–10.8)

## 2022-08-05 PROCEDURE — G0378 HOSPITAL OBSERVATION PER HR: HCPCS

## 2022-08-05 PROCEDURE — 99217 PR OBSERVATION CARE DISCHARGE MANAGEMENT: CPT | Performed by: NURSE PRACTITIONER

## 2022-08-05 PROCEDURE — 85025 COMPLETE CBC W/AUTO DIFF WBC: CPT | Performed by: NURSE PRACTITIONER

## 2022-08-05 PROCEDURE — 45378 DIAGNOSTIC COLONOSCOPY: CPT | Performed by: SURGERY

## 2022-08-05 PROCEDURE — 82272 OCCULT BLD FECES 1-3 TESTS: CPT | Performed by: NURSE PRACTITIONER

## 2022-08-05 PROCEDURE — 80053 COMPREHEN METABOLIC PANEL: CPT | Performed by: NURSE PRACTITIONER

## 2022-08-05 PROCEDURE — A9270 NON-COVERED ITEM OR SERVICE: HCPCS

## 2022-08-05 PROCEDURE — 97161 PT EVAL LOW COMPLEX 20 MIN: CPT

## 2022-08-05 PROCEDURE — 63710000001 SIMETHICONE 40 MG/0.6ML SUSPENSION

## 2022-08-05 PROCEDURE — 97165 OT EVAL LOW COMPLEX 30 MIN: CPT

## 2022-08-05 PROCEDURE — 82962 GLUCOSE BLOOD TEST: CPT

## 2022-08-05 PROCEDURE — 25010000002 PROPOFOL 1000 MG/100ML EMULSION: Performed by: NURSE ANESTHETIST, CERTIFIED REGISTERED

## 2022-08-05 PROCEDURE — 96374 THER/PROPH/DIAG INJ IV PUSH: CPT

## 2022-08-05 RX ORDER — LIDOCAINE HYDROCHLORIDE 20 MG/ML
INJECTION, SOLUTION INTRAVENOUS AS NEEDED
Status: DISCONTINUED | OUTPATIENT
Start: 2022-08-05 | End: 2022-08-05 | Stop reason: SURG

## 2022-08-05 RX ORDER — SODIUM CHLORIDE 0.9 % (FLUSH) 0.9 %
10 SYRINGE (ML) INJECTION AS NEEDED
Status: DISCONTINUED | OUTPATIENT
Start: 2022-08-05 | End: 2022-08-05 | Stop reason: HOSPADM

## 2022-08-05 RX ORDER — CARVEDILOL 6.25 MG/1
6.25 TABLET ORAL 2 TIMES DAILY WITH MEALS
Status: DISCONTINUED | OUTPATIENT
Start: 2022-08-05 | End: 2022-08-05 | Stop reason: HOSPADM

## 2022-08-05 RX ORDER — CALCITRIOL 0.25 UG/1
0.25 CAPSULE, LIQUID FILLED ORAL DAILY
Status: DISCONTINUED | OUTPATIENT
Start: 2022-08-05 | End: 2022-08-05 | Stop reason: HOSPADM

## 2022-08-05 RX ORDER — ATORVASTATIN CALCIUM 20 MG/1
20 TABLET, FILM COATED ORAL NIGHTLY
Status: DISCONTINUED | OUTPATIENT
Start: 2022-08-05 | End: 2022-08-05 | Stop reason: HOSPADM

## 2022-08-05 RX ORDER — PROPOFOL 10 MG/ML
INJECTION, EMULSION INTRAVENOUS AS NEEDED
Status: DISCONTINUED | OUTPATIENT
Start: 2022-08-05 | End: 2022-08-05 | Stop reason: SURG

## 2022-08-05 RX ORDER — FUROSEMIDE 40 MG/1
40 TABLET ORAL DAILY
Status: DISCONTINUED | OUTPATIENT
Start: 2022-08-05 | End: 2022-08-05 | Stop reason: HOSPADM

## 2022-08-05 RX ORDER — BUDESONIDE AND FORMOTEROL FUMARATE DIHYDRATE 160; 4.5 UG/1; UG/1
2 AEROSOL RESPIRATORY (INHALATION)
Status: DISCONTINUED | OUTPATIENT
Start: 2022-08-05 | End: 2022-08-05 | Stop reason: HOSPADM

## 2022-08-05 RX ORDER — CITALOPRAM 20 MG/1
40 TABLET ORAL EVERY MORNING
Status: DISCONTINUED | OUTPATIENT
Start: 2022-08-06 | End: 2022-08-05 | Stop reason: HOSPADM

## 2022-08-05 RX ORDER — SODIUM CHLORIDE, SODIUM LACTATE, POTASSIUM CHLORIDE, CALCIUM CHLORIDE 600; 310; 30; 20 MG/100ML; MG/100ML; MG/100ML; MG/100ML
1000 INJECTION, SOLUTION INTRAVENOUS CONTINUOUS
Status: DISCONTINUED | OUTPATIENT
Start: 2022-08-05 | End: 2022-08-05 | Stop reason: HOSPADM

## 2022-08-05 RX ORDER — MAGNESIUM HYDROXIDE 1200 MG/15ML
LIQUID ORAL AS NEEDED
Status: DISCONTINUED | OUTPATIENT
Start: 2022-08-05 | End: 2022-08-05 | Stop reason: HOSPADM

## 2022-08-05 RX ORDER — SIMETHICONE 20 MG/.3ML
EMULSION ORAL AS NEEDED
Status: DISCONTINUED | OUTPATIENT
Start: 2022-08-05 | End: 2022-08-05 | Stop reason: HOSPADM

## 2022-08-05 RX ADMIN — Medication 10 ML: at 09:10

## 2022-08-05 RX ADMIN — PANTOPRAZOLE SODIUM 40 MG: 40 INJECTION, POWDER, FOR SOLUTION INTRAVENOUS at 06:07

## 2022-08-05 RX ADMIN — SODIUM CHLORIDE, POTASSIUM CHLORIDE, SODIUM LACTATE AND CALCIUM CHLORIDE 1000 ML: 600; 310; 30; 20 INJECTION, SOLUTION INTRAVENOUS at 14:00

## 2022-08-05 RX ADMIN — PROPOFOL 50 MG: 10 INJECTION, EMULSION INTRAVENOUS at 15:36

## 2022-08-05 RX ADMIN — PROPOFOL 50 MG: 10 INJECTION, EMULSION INTRAVENOUS at 15:25

## 2022-08-05 RX ADMIN — LIDOCAINE HYDROCHLORIDE 60 MG: 20 INJECTION, SOLUTION INTRAVENOUS at 15:25

## 2022-08-05 NOTE — PLAN OF CARE
Goal Outcome Evaluation:  Plan of Care Reviewed With: patient        Progress: improving  Outcome Evaluation: no acute events during shift. SB on monitor. vss. standby assist to BSC. bowel prep effective. informed consent for colonoscopy signed and in pt chart. NPO at midnight. no other changes in pt condition. will continue to monitor.

## 2022-08-05 NOTE — NURSING NOTE
Patient back from colonoscopy daughter at beside, pt alert and oriented requesting ice and something to eat.Hospuitalist at bedside states she is puttin in orders for discharge

## 2022-08-05 NOTE — THERAPY EVALUATION
Patient Name: Milagros Gil  : 1950    MRN: [id number]                              Today's Date: 2022       Admit Date: 2022    Visit Dx:     ICD-10-CM ICD-9-CM   1. Bright red rectal bleeding  K62.5 569.3     Patient Active Problem List   Diagnosis   • Bright red rectal bleeding     Past Medical History:   Diagnosis Date   • CHF (congestive heart failure) (HCC)    • COPD (chronic obstructive pulmonary disease) (HCC)    • Diabetes mellitus (HCC)    • Hypertension    • Renal disorder    • Stroke (HCC)      Past Surgical History:   Procedure Laterality Date   • COLONOSCOPY     • HYSTERECTOMY        General Information     Row Name 22 1156          Physical Therapy Time and Intention    Document Type evaluation  -MS     Mode of Treatment physical therapy  -MS     Row Name 22 1156          General Information    Patient Profile Reviewed yes  -MS     Prior Level of Function independent:;all household mobility  -MS     Row Name 22 1156          Living Environment    People in Home child(deven), adult  -MS     Row Name 22 1156          Home Main Entrance    Number of Stairs, Main Entrance one  -MS     Stair Railings, Main Entrance none  -MS     Row Name 22 1156          Cognition    Orientation Status (Cognition) oriented x 4  -MS     Row Name 22 1156          Safety Issues, Functional Mobility    Safety Issues Affecting Function (Mobility) positioning of assistive device;insight into deficits/self-awareness;awareness of need for assistance  -MS     Impairments Affecting Function (Mobility) endurance/activity tolerance  -MS           User Key  (r) = Recorded By, (t) = Taken By, (c) = Cosigned By    Initials Name Provider Type    MS Troy Chester PT Physical Therapist               Mobility     Row Name 22 1157          Bed Mobility    Comment, (Bed Mobility) Pt received sitting EOB  -MS     Row Name 22 1157          Sit-Stand Transfer    Sit-Stand  [place] (Transfers) contact guard  -MS     Assistive Device (Sit-Stand Transfers) walker, front-wheeled  -MS     Row Name 08/05/22 1157          Gait/Stairs (Locomotion)    [place] Level (Gait) contact guard  -MS     Assistive Device (Gait) walker, front-wheeled  -MS     Distance in Feet (Gait) 60  -MS     Deviations/Abnormal Patterns (Gait) festinating/shuffling;gait speed decreased  -MS           User Key  (r) = Recorded By, (t) = Taken By, (c) = Cosigned By    Initials Name Provider Type    Troy Blue PT Physical Therapist               Obj/Interventions     Row Name 08/05/22 1159          Range of Motion Comprehensive    General Range of Motion bilateral lower extremity ROM WFL  -MS     Row Name 08/05/22 1159          Strength Comprehensive (MMT)    General Manual Muscle Testing (MMT) Assessment lower extremity strength deficits identified  -MS     Comment, General Manual Muscle Testing (MMT) Assessment BL E 4-/5  -MS     Row Name 08/05/22 1159          Balance    Balance Assessment sit to stand dynamic balance;standing dynamic balance  -MS     Sit to Stand Dynamic Balance contact guard  -MS     Dynamic Standing Balance contact guard  -MS     Position/Device Used, Standing Balance walker, front-wheeled  -MS           User Key  (r) = Recorded By, (t) = Taken By, (c) = Cosigned By    Initials Name Provider Type    Troy Blue PT Physical Therapist               Goals/Plan     Row Name 08/05/22 1203          Transfer Goal 1 (PT)    Activity/Assistive Device (Transfer Goal 1, PT) transfers, all;sit-to-stand/stand-to-sit  -MS     [place] Level/Cues Needed (Transfer Goal 1, PT) modified independence  -MS     Time Frame (Transfer Goal 1, PT) long term goal (LTG);10 days  -MS     Row Name 08/05/22 1203          Gait Training Goal 1 (PT)    Activity/Assistive Device (Gait Training Goal 1, PT) gait (walking locomotion);assistive device use  -MS     [place] Level (Gait Training Goal  1, PT) modified independence  -MS     Time Frame (Gait Training Goal 1, PT) long term goal (LTG);10 days  -MS     Row Name 08/05/22 1203          Patient Education Goal (PT)    Activity (Patient Education Goal, PT) ther exer x15 reps B LE  -MS     [place]/Cues/Accuracy (Memory Goal 2, PT) demonstrates adequately  -MS     Time Frame (Patient Education Goal, PT) long term goal (LTG);10 days  -MS     Row Name 08/05/22 1203          Therapy Assessment/Plan (PT)    Planned Therapy Interventions (PT) balance training;bed mobility training;gait training;home exercise program;stair training;ROM (range of motion);transfer training;strengthening;patient/family education  -MS           User Key  (r) = Recorded By, (t) = Taken By, (c) = Cosigned By    Initials Name Provider Type    Troy Blue, PT Physical Therapist               Clinical Impression     Row Name 08/05/22 1200          Pain    Pretreatment Pain Rating 4/10  -MS     Posttreatment Pain Rating 4/10  -MS     Pain Location upper  -MS     Pain Location - back  -MS     Pain Intervention(s) Repositioned;Ambulation/increased activity  -MS     Row Name 08/05/22 1200          Plan of Care Review    Plan of Care Reviewed With patient  -MS     Progress no change  -MS     Outcome Evaluation Pt seen for PT eval this date. Pt presents with deficits in strength, gait and mobility. Pt required increased time to complete transfers and use of AD with gait. Pt ambulated 60ft with CGA and Rwx with cues for safe Rwx proximity. Pt is expected to benefit from skilled PTx during this inpatient stay.  -MS     Row Name 08/05/22 1200          Therapy Assessment/Plan (PT)    Rehab Potential (PT) good, to achieve stated therapy goals  -MS     Criteria for Skilled Interventions Met (PT) yes;meets criteria  -MS     Therapy Frequency (PT) 5 times/wk  -MS     Row Name 08/05/22 1200          Vital Signs    Pre SpO2 (%) 97  -MS     O2 Delivery Pre Treatment room air  -MS     Intra SpO2  (%) 94  -MS     O2 Delivery Intra Treatment room air  -MS     Post SpO2 (%) 95  -MS     O2 Delivery Post Treatment room air  -MS     Pre Patient Position Supine  -MS     Intra Patient Position Standing  -MS     Post Patient Position Supine  -MS     Row Name 08/05/22 1200          Positioning and Restraints    Pre-Treatment Position in bed  -MS     Post Treatment Position chair  -MS     In Chair reclined;call light within reach;encouraged to call for assist  -MS           User Key  (r) = Recorded By, (t) = Taken By, (c) = Cosigned By    Initials Name Provider Type    Troy Blue, PT Physical Therapist               Outcome Measures     Row Name 08/05/22 1204          How much help from another person do you currently need...    Turning from your back to your side while in flat bed without using bedrails? 4  -MS     Moving from lying on back to sitting on the side of a flat bed without bedrails? 4  -MS     Moving to and from a bed to a chair (including a wheelchair)? 3  -MS     Standing up from a chair using your arms (e.g., wheelchair, bedside chair)? 3  -MS     Climbing 3-5 steps with a railing? 3  -MS     To walk in hospital room? 3  -MS     AM-PAC 6 Clicks Score (PT) 20  -MS     Highest level of mobility 6 --> Walked 10 steps or more  -MS     Row Name 08/05/22 1204 08/05/22 1100       Functional Assessment    Outcome Measure Options AM-PAC 6 Clicks Basic Mobility (PT)  -MS AM-PAC 6 Clicks Daily Activity (OT)  -          User Key  (r) = Recorded By, (t) = Taken By, (c) = Cosigned By    Initials Name Provider Type    Nitza Boyce Occupational Therapist    Troy Blue, PT Physical Therapist                             Physical Therapy Education                 Title: PT OT SLP Therapies (In Progress)     Topic: Physical Therapy (In Progress)     Point: Mobility training (Done)     Learning Progress Summary           Patient Acceptance EEMMA by MS at 8/5/2022 1204    Comment: importance of  mobility                   Point: Home exercise program (Done)     Learning Progress Summary           Patient Acceptance, E, VU by MS at 8/5/2022 1204    Comment: importance of mobility                   Point: Body mechanics (Not Started)     Learner Progress:  Not documented in this visit.          Point: Precautions (Not Started)     Learner Progress:  Not documented in this visit.                      User Key     Initials Effective Dates Name Provider Type Discipline    MS 07/01/22 -  Troy Chester PT Physical Therapist PT              PT Recommendation and Plan  Planned Therapy Interventions (PT): balance training, bed mobility training, gait training, home exercise program, stair training, ROM (range of motion), transfer training, strengthening, patient/family education  Plan of Care Reviewed With: patient  Progress: no change  Outcome Evaluation: Pt seen for PT eval this date. Pt presents with deficits in strength, gait and mobility. Pt required increased time to complete transfers and use of AD with gait. Pt ambulated 60ft with CGA and Rwx with cues for safe Rwx proximity. Pt is expected to benefit from skilled PTx during this inpatient stay.     Time Calculation:    PT Charges     Row Name 08/05/22 1207             Time Calculation    Start Time 1002  -MS      PT Received On 08/05/22  -MS      PT Goal Re-Cert Due Date 08/15/22  -MS              Untimed Charges    PT Eval/Re-eval Minutes 45  -MS              Total Minutes    Untimed Charges Total Minutes 45  -MS       Total Minutes 45  -MS            User Key  (r) = Recorded By, (t) = Taken By, (c) = Cosigned By    Initials Name Provider Type    MS Troy Chester PT Physical Therapist              Therapy Charges for Today     Code Description Service Date Service Provider Modifiers Qty    66205472491 HC PT EVAL LOW COMPLEXITY 3 8/5/2022 Troy Chester PT GP 1          PT G-Codes  Outcome Measure Options: AM-PAC 6 Clicks Basic Mobility  (PT)  AM-PAC 6 Clicks Score (PT): 20  AM-PAC 6 Clicks Score (OT): 22    Troy Chester, PT  8/5/2022

## 2022-08-05 NOTE — PLAN OF CARE
Goal Outcome Evaluation:  Plan of Care Reviewed With: patient        Progress: no change  Outcome Evaluation: Pt seen for PT eval this date. Pt presents with deficits in strength, gait and mobility. Pt required increased time to complete transfers and use of AD with gait. Pt ambulated 60ft with CGA and Rwx with cues for safe Rwx proximity. Pt is expected to benefit from skilled PTx during this inpatient stay.

## 2022-08-05 NOTE — DISCHARGE SUMMARY
[facility]   DISCHARGE SUMMARY      Name:  Milagros Gil   Age:  72 y.o.  Sex:  female  :  1950  MRN:  [id number]   Visit Number:  [id number]    Admission Date:  2022  Date of Discharge:  2022  Primary Care Physician:  Provider, No Known    Important issues to note:    1.  Admitted to the hospital for rectal bleeding with surgery consulted.  H/H stable on admission.    2.  Taken for colonoscopy 2022.  Tolerated procedure well.  No active bleeding noted, diverticuli and internal hemorrhoids were present.   recommended high fiber diet and avoiding constipation at discharge.    3.  Stable for discharge home today.  May resume home medications.  Return to the hospital with any further bleeding.  Follow up with PCP and GI doctors in [place].     Discharge Diagnoses:       1. Acute Rectal Bleeding, POA  2. Stage 4 Chronic Kidney Disease  3. Hypertension  4. Parkinsons Disease  5. Type 2 Diabetes Mellitus on insulin therapy  6. COPD not in exacerbation  7. History of CVA  8. Combined diastolic and systolic heart failure      Problem List:     Active Hospital Problems    Diagnosis  POA   • **Bright red rectal bleeding [K62.5]  Yes      Resolved Hospital Problems   No resolved problems to display.     Presenting Problem:    Chief Complaint   Patient presents with   • Rectal Bleeding      Consults:     Consulting Physician(s)  Chat With All Active Members    Provider Relationship Specialty    Kaz Christensen MD  Consulting Physician General Surgery        Procedures Performed:    Procedure(s):  COLONOSCOPY    History of presenting illness/Hospital Course:    Patient is a 72 year old female who presents to the hospital with family 2022 with complaints of bright red rectal bleeding.  Patient is here visiting from [place] and staying with daughter.  Patient stated that last evening she started having bright red bleeding from her rectum with associated symptoms  including cramping in lower abdomen.  Patient denies any pain currently but states abdomen has some discomfort with palpation.  States her bowel movements vary in consistency.  Patient with history of hemorrhoids with most recent colonoscopy one year ago with polyps removed and nothing else noted to be abnormal. Denied dizziness, fever, shortness of breath, dysuria, or recent sick contacts.  Patient with Plavix noted on medication list.  Patient with past health history significant for COPD, combined systolic/diastolic heart failure, hypertension, CKD stage 4, Parkinsons disease and history of CVA.  Patient lives with her son in [place] and uses a walker at baseline.     Work up in the Emergency Department noted glucose-146, creatinine-2.9 which is close to her baseline, BUN-39, hemoglobin-10.6 down from 12.4 in March of this year.  CT Abd/Pelvis noted no acute pathology and redundant colonic loops with extensive colonic diverticulosis.  Urinalysis was normal.  Covid 19 was negative.  Patient was given normal saline bolus 500ml and hospitalist was contacted for admission due to rectal bleeding with general surgery consulting (Amparo).      Labs remained stable with hemoglobin at 10.7 this morning.  Taken to OR with  8/5/2022 for colonoscopy which noted a few diverticuli and internal hemorrhoids.  No new or old blood noted.  Diverticulosis in the sigmoid colon, but the examination was otherwise normal.  No specimens collected.   noted bleeding may have been diverticular or hemorrhoidal. No current bleeding.  Recommended high fiber diet and avoiding constipation.    Patient is stable for discharge home today with family.  Patient tolerated procedure well.  Will advise that patient may resume home medications.  Follow up with PCP and GI doctor in [place] on return.  Return to the hospital with any concerns or further bleeding.    Vital Signs:    Temp:  [97.6 °F (36.4 °C)-99.2 °F (37.3 °C)] 99.1 °F (37.3  °C)  Heart Rate:  [57-77] 65  Resp:  [11-19] 12  BP: (124-156)/(54-77) 146/68    Physical Exam:    General Appearance:  Alert and cooperative, pleasant elderly female, appears chronically ill, no acute distress   Head:  Atraumatic and normocephalic.   Eyes: Conjunctivae and sclerae normal, no icterus. No pallor.   Ears:  Ears with no abnormalities noted.   Throat: No oral lesions, no thrush, oral mucosa moist.   Neck: Supple, trachea midline   Back:   No kyphoscoliosis present. No tenderness to palpation.   Lungs:   Breath sounds heard bilaterally equally.  No crackles or wheezing. Unlabored on room air   Heart:  Normal S1 and S2, no murmur, no gallop, no rub. No JVD.   Abdomen:   Normal bowel sounds, no masses, no organomegaly. Soft, nontender, nondistended, no rebound tenderness.   Extremities: Supple, no edema, no cyanosis, no clubbing.   Pulses: Pulses palpable bilaterally.   Skin: No bleeding or rash.   Neurologic: Alert and oriented x 3. No facial asymmetry. Moves all four limbs. Generalized weakness is present.     Pertinent Lab Results:     Results from last 7 days   Lab Units 08/05/22  0535 08/04/22  1211   SODIUM mmol/L 142 137   POTASSIUM mmol/L 4.6 4.9   CHLORIDE mmol/L 108* 102   CO2 mmol/L 21.0* 22.4   BUN mg/dL 41* 39*   CREATININE mg/dL 2.49* 2.94*   CALCIUM mg/dL 9.1 9.1   BILIRUBIN mg/dL 0.3 0.2   ALK PHOS U/L 75 93   ALT (SGPT) U/L <5 <5   AST (SGOT) U/L 12 10   GLUCOSE mg/dL 101* 146*     Results from last 7 days   Lab Units 08/05/22  0535 08/04/22  1211   WBC 10*3/mm3 6.98 8.28   HEMOGLOBIN g/dL 10.7* 10.6*   HEMATOCRIT % 35.4 35.3   PLATELETS 10*3/mm3 156 175     Results from last 7 days   Lab Units 08/04/22  1211   INR  0.99                 Results from last 7 days   Lab Units 08/04/22  1211   LIPASE U/L 47               Pertinent Radiology Results:    Imaging Results (All)     Procedure Component Value Units Date/Time    CT Abdomen Pelvis Without Contrast [[id number]] Collected: 08/04/22 1326      Updated: 08/04/22 1412    Narrative:      CT of the abdomen and pelvis without contrast     INDICATION: Rectal bleeding, lower back pain     COMPARISON: None     TECHNIQUE: Helically acquired axial multidetector CT images were  obtained from the lung bases through the pelvis without IV contrast.  Dose reduction techniques to achieve ALARA were employed.      Findings:     Lung bases: [No focal pneumonia]. Mild cardiomegaly. Multivessel  coronary artery calcifications.     Liver: [Grossly unremarkable]     Spleen: [Unremarkable]     Gallbladder/biliary tree: [ No biliary ductal dilatation].  Cholecystectomy.     Pancreas: [Grossly unremarkable].     Adrenals: [Unremarkable]     Kidneys: [Punctate 2 to 3 mm nonobstructing left renal calculi. No  hydronephrosis].     GI: [No bowel obstruction]. Redundant colonic loops with extensive  colonic diverticulosis. No acute diverticulitis     Bladder: [Unremarkable].     Bones: [No significant osseous abnormalities are identified].     Soft Tissues: {unremarkable]. Small fat-containing right inguinal  hernia.       Impression:      No acute abdominopelvic pathology. Redundant colonic loops with  extensive colonic diverticulosis.     This report was signed and finalized on 8/4/2022 1:32 PM by Navdeep Calderon MD.          Condition on Discharge:      Stable.    Code status during the hospital stay:    Code Status and Medical Interventions:   Ordered at: 08/04/22 1427     Level Of Support Discussed With:    Patient     Code Status (Patient has no pulse and is not breathing):    CPR (Attempt to Resuscitate)     Medical Interventions (Patient has pulse or is breathing):    Full Support     Discharge Disposition:    Home or Self Care    Discharge Medications:       Discharge Medications      Continue These Medications      Instructions Start Date   atorvastatin 20 MG tablet  Commonly known as: LIPITOR   20 mg, Oral, Nightly      calcitriol 0.25 MCG capsule  Commonly known  as: ROCALTROL   0.25 mcg, Oral, Daily      carbidopa-levodopa  MG per tablet  Commonly known as: SINEMET   1 tablet, Oral, 3 Times Daily      carvedilol 6.25 MG tablet  Commonly known as: COREG   6.25 mg, Oral, 2 Times Daily With Meals      citalopram 40 MG tablet  Commonly known as: CeleXA   40 mg, Oral, Every Morning      dicyclomine 10 MG capsule  Commonly known as: BENTYL   10 mg, Oral, 4 Times Daily Before Meals & Nightly, 1 capsule by mouth daily and three times daily as needed      furosemide 40 MG tablet  Commonly known as: LASIX   40 mg, Oral, Daily      hydrOXYzine 25 MG tablet  Commonly known as: ATARAX   25 mg, Oral, 2 Times Daily PRN      loperamide 2 MG capsule  Commonly known as: IMODIUM   2 mg, Oral, 4 Times Daily PRN      omeprazole 20 MG capsule  Commonly known as: priLOSEC   20 mg, Oral, Daily      pimavanserin tartrate 17 MG tablet tablet  Commonly known as: NUPLAZID   34 mg, Oral, Daily      simethicone 125 MG chewable tablet  Commonly known as: MYLICON   125 mg, Oral, Every 6 Hours PRN      Trelegy Ellipta 100-62.5-25 MCG/INH inhaler  Generic drug: Fluticasone-Umeclidin-Vilant   1 puff, Inhalation, Daily - RT      VICTOZA SC   Subcutaneous           Discharge Diet:     Diet Instructions     Advance Diet As Tolerated      Diet: Regular      Discharge Diet: Regular    High fiber        Activity at Discharge:     Activity Instructions     Activity as Tolerated      Gradually Increase Activity Until at Pre-Hospitalization Level          Follow-up Appointments:     Follow-up Information     Provider, No Known Follow up.    Why: Follow up with your PCP in [place] when you return   Follow up with your GI doctor as well  Contact information:  [facility] [address] [phone]                       No future appointments.       YASMIN Smith  08/05/22  16:32 EDT    Time: I spent 29 minutes on this discharge activity which included: face-to-face encounter with the  patient, reviewing the data in the system, coordination of the care with the nursing staff as well as consultants, documentation, and entering orders.     Dictated utilizing Dragon dictation.

## 2022-08-05 NOTE — ANESTHESIA PREPROCEDURE EVALUATION
Anesthesia Evaluation     Patient summary reviewed and Nursing notes reviewed   no history of anesthetic complications:  NPO Solid Status: > 8 hours  NPO Liquid Status: > 8 hours           Airway   Mallampati: II  TM distance: >3 FB  Neck ROM: full  Possible difficult intubation  Dental - normal exam     Pulmonary - normal exam   (+) a smoker Former, COPD,   Cardiovascular - normal exam    ECG reviewed  PT is on anticoagulation therapy  Patient on routine beta blocker  Rhythm: regular  Rate: normal    (+) hypertension 2 medications or greater, CHF ,       Neuro/Psych  (+) neuromuscular disease, CVA,      ROS Comment: Parkinsons Dz  GI/Hepatic/Renal/Endo    (+) obesity, morbid obesity, GI bleeding active bleeding, renal disease stones and CRI, diabetes mellitus type 2,     Musculoskeletal (-) negative ROS    Abdominal    Substance History - negative use     OB/GYN negative ob/gyn ROS         Other - negative ROS       ROS/Med Hx Other: Assessment:     1. Acute Rectal Bleeding, POA  2. Stage 4 Chronic Kidney Disease  3. Hypertension  4. Parkinsons Disease  5. Type 2 Diabetes Mellitus on insulin therapy  6. COPD not in exacerbation  7. History of CVA  8. Combined diastolic and systolic heart failure                  Anesthesia Plan    ASA 3     MAC     (Risks and benefits discussed including risk of aspiration, recall and dental damage. All patient questions answered. Will continue with POC.)  intravenous induction     Anesthetic plan, risks, benefits, and alternatives have been provided, discussed and informed consent has been obtained with: patient.        CODE STATUS:    Level Of Support Discussed With: Patient  Code Status (Patient has no pulse and is not breathing): CPR (Attempt to Resuscitate)  Medical Interventions (Patient has pulse or is breathing): Full Support

## 2022-08-05 NOTE — PLAN OF CARE
Goal Outcome Evaluation:  Plan of Care Reviewed With: patient        Progress: no change  Outcome Evaluation: OT evaluation completed today.  Pt presents with weakness and decreased endurance to activity.  Pt able to get eob independently, completed toileting tasks with sba and walked 60' with cga using RW.  Pt is expected to benefit from skilled OT to improve her strength and independence with ADL tasks.

## 2022-08-05 NOTE — ANESTHESIA POSTPROCEDURE EVALUATION
Patient: Milagros Gil    Procedure Summary     Date: 08/05/22 Room / [facility]: [facility]    Anesthesia Start: 1524 Anesthesia Stop: 1546    Procedure: COLONOSCOPY (N/A Anus) Diagnosis:       Bright red rectal bleeding      (Bright red rectal bleeding [K62.5])    Surgeons: Kaz Christensen MD Provider: Sarthak Russo CRNA    Anesthesia Type: MAC ASA Status: 3          Anesthesia Type: MAC    Vitals  No vitals data found for the desired time range.          Post Anesthesia Care and Evaluation    Patient location during evaluation: PACU  Patient participation: complete - patient participated  Level of consciousness: awake  Pain score: 0  Pain management: adequate    Airway patency: patent  Anesthetic complications: No anesthetic complications  PONV Status: controlled  Cardiovascular status: acceptable and stable  Respiratory status: acceptable and room air  Hydration status: acceptable    Comments: Vital signs as noted in nursing documentation as per protocol

## 2022-08-05 NOTE — CASE MANAGEMENT/SOCIAL WORK
Case Management Discharge Note                Selected Continued Care - Admitted Since 8/4/2022     Destination    No services have been selected for the patient.              Durable Medical Equipment    No services have been selected for the patient.              Dialysis/Infusion    No services have been selected for the patient.              Home Medical Care    No services have been selected for the patient.              Therapy    No services have been selected for the patient.              Community Resources    No services have been selected for the patient.              [facility]    No services have been selected for the patient.                  Transportation Services  Private: Car    Final Discharge Disposition Code: 01 - home or self-care

## 2022-08-05 NOTE — CASE MANAGEMENT/SOCIAL WORK
Discharge Planning Assessment   [place]     Patient Name: Milagros Gil  MRN: [id number]  Today's Date: 8/5/2022    Admit Date: 8/4/2022     Discharge Needs Assessment     Row Name 08/05/22 1323       Living Environment    People in Home child(deven), adult;grandchild(deven)    Name(s) of People in Home Pt was asked to come stay at her sons and daughter in law house to help them with their 2 small kids   She said that was over 11 years ago  Pt is currently visiting with her daughter for a month and plans to go back to [place]    Duration at Residence see above comments    Primary Care Provided by self    Provides Primary Care For no one    Caregiving Concerns none    Family Caregiver if Needed child(deven), adult    Quality of Family Relationships helpful;involved;supportive    Able to Return to Prior Arrangements yes       Resource/Environmental Concerns    Resource/Environmental Concerns none       Transition Planning    Patient/Family Anticipates Transition to home    Patient/Family Anticipated Services at Transition none    Transportation Anticipated family or friend will provide       Discharge Needs Assessment    Readmission Within the Last 30 Days no previous admission in last 30 days    Equipment Currently Used at Home cpap;rollator;glucometer    Concerns to be Addressed no discharge needs identified    Equipment Needed After Discharge none               Discharge Plan     Row Name 08/05/22 1341       Plan    Plan Spoke with pt about DCP  Confirmed current address in [place] but she is here visiting daughter  Milena Walker, [phone] and POA  PCP Dr Waite  Pt plans on returning to [place] when she is well  Pt states she is able to perform all ADL well DME rollator, Cpap and glucometer  Pt can't remember the name of her DME company or HH  Will continue to assess pt for any further needs prior to being discharged home  Pt states she is having a endoscopy today  Daughter at bedside and pt waiting to go to  procedure              Continued Care and Services - Admitted Since 8/4/2022    Coordination has not been started for this encounter.       Expected Discharge Date and Time     Expected Discharge Date Expected Discharge Time    Aug 9, 2022          Demographic Summary     Row Name 08/05/22 1314       General Information    Admission Type observation    Arrived From emergency department    Required Notices Provided Observation Status Notice    Referral Source admission list    Reason for Consult discharge planning    Preferred Language English       Contact Information    Permission Granted to Share Info With     Contact Information Obtained for     Row Name 08/05/22 1312       General Information    Admission Type observation    Arrived From emergency department               Functional Status     Row Name 08/05/22 1314       Functional Status    Usual Activity Tolerance good    Functional Status Comments Pt states she can do all ADL good and without problems       Functional Status, IADL    Medications independent    Meal Preparation independent    Housekeeping independent    Laundry independent    Shopping independent       Employment/    Employment Status retired               Psychosocial    No documentation.                Abuse/Neglect    No documentation.                Legal    No documentation.                Substance Abuse    No documentation.                Patient Forms    No documentation.                   Stephanie Hines RN

## 2022-08-05 NOTE — THERAPY EVALUATION
Patient Name: Milagros Gil  : 1950    MRN: [id number]                              Today's Date: 2022       Admit Date: 2022    Visit Dx:     ICD-10-CM ICD-9-CM   1. Bright red rectal bleeding  K62.5 569.3     Patient Active Problem List   Diagnosis   • Bright red rectal bleeding     Past Medical History:   Diagnosis Date   • CHF (congestive heart failure) ([facility])    • COPD (chronic obstructive pulmonary disease) ([facility])    • Diabetes mellitus (HCC)    • Hypertension    • Renal disorder    • Stroke (HCC)      Past Surgical History:   Procedure Laterality Date   • COLONOSCOPY     • HYSTERECTOMY        General Information     Row Name 22 1030          OT Time and Intention    Document Type evaluation  -     Mode of Treatment occupational therapy  -     Row Name 22 1030          General Information    Patient Profile Reviewed yes  -     Prior Level of Function independent:;ADL's;all household mobility  -     Row Name 22 1030          Occupational Profile    Reason for Services/Referral (Occupational Profile) ADL decline  -     Row Name 22 1030          Living Environment    People in Home child(deven), adult  -     Row Name 22 1030          Home Main Entrance    Number of Stairs, Main Entrance one  -     Row Name 22 1030          Cognition    Orientation Status (Cognition) oriented x 4  -     Row Name 22 1030          Safety Issues, Functional Mobility    Safety Issues Affecting Function (Mobility) positioning of assistive device  -     Impairments Affecting Function (Mobility) endurance/activity tolerance  -           User Key  (r) = Recorded By, (t) = Taken By, (c) = Cosigned By    Initials Name Provider Type     Nitza Feliciano Occupational Therapist                 Mobility/ADL's     Row Name 22 1036          Transfers    Transfers sit-stand transfer  -     Sit-Stand [place] (Transfers) contact guard  -     Row Name 22  1036          Sit-Stand Transfer    Assistive Device (Sit-Stand Transfers) walker, front-wheeled  -[facility] Name 08/05/22 1036          Functional Mobility    Functional Mobility- Ind. Level contact guard assist  -     Functional Mobility- Device walker, front-wheeled  -     Functional Mobility-Distance (Feet) 60  -[facility] Name 08/05/22 1036          Activities of Daily Living    BADL Assessment/Intervention bathing;upper body dressing;lower body dressing;grooming;feeding;toileting  -AH     Row Name 08/05/22 1036          Bathing Assessment/Intervention    [place] Level (Bathing) set up  -AH     Row Name 08/05/22 1036          Upper Body Dressing Assessment/Training    [place] Level (Upper Body Dressing) independent  -[facility] Name 08/05/22 1036          Lower Body Dressing Assessment/Training    [place] Level (Lower Body Dressing) contact guard assist  -[facility] Name 08/05/22 1036          Grooming Assessment/Training    [place] Level (Grooming) standby assist  -AH     Row Name 08/05/22 1036          Self-Feeding Assessment/Training    [place] Level (Feeding) independent  -AH     Row Name 08/05/22 1036          Toileting Assessment/Training    [place] Level (Toileting) supervision  -           User Key  (r) = Recorded By, (t) = Taken By, (c) = Cosigned By    Initials Name Provider Type    Nitza Boyce Occupational Therapist               Obj/Interventions     [facility] Name 08/05/22 1052          Sensory Assessment (Somatosensory)    Sensory Assessment (Somatosensory) sensation intact  -AH     Row Name 08/05/22 1052          Vision Assessment/Intervention    Visual Impairment/Limitations [facility] Name 08/05/22 1052          Range of Motion Comprehensive    General Range of Motion bilateral upper extremity ROM WFL  -AH     Row Name 08/05/22 1052          Strength Comprehensive (MMT)    Comment, General Manual Muscle Testing (MMT) Assessment [facility]            User Key  (r) = Recorded By, (t) = Taken By, (c) = Cosigned By    Initials Name Provider Type    Nitza Boyce Occupational Therapist               Goals/Plan     Row Name 08/05/22 1058          Transfer Goal 1 (OT)    Activity/Assistive Device (Transfer Goal 1, OT) sit-to-stand/stand-to-sit  -     [place] Level/Cues Needed (Transfer Goal 1, OT) modified independence  -     Time Frame (Transfer Goal 1, OT) by discharge  -     Progress/Outcome (Transfer Goal 1, OT) goal ongoing  -AH     Row Name 08/05/22 1058          Dressing Goal 1 (OT)    Activity/Device (Dressing Goal 1, OT) dressing skills, all  -     [place]/Cues Needed (Dressing Goal 1, OT) independent  -     Time Frame (Dressing Goal 1, OT) long term goal (LTG);5 days  -     Progress/Outcome (Dressing Goal 1, OT) goal ongoing  -AH     Row Name 08/05/22 1058          Strength Goal 1 (OT)    Strength Goal 1 (OT) Pt will perform UB strengthening ex using theraband for resistance.  -     Time Frame (Strength Goal 1, OT) by discharge  -     Progress/Outcome (Strength Goal 1, OT) goal ongoing  -AH     Row Name 08/05/22 1058          Therapy Assessment/Plan (OT)    Planned Therapy Interventions (OT) activity tolerance training;BADL retraining;patient/caregiver education/training;transfer/mobility retraining;strengthening exercise  -           User Key  (r) = Recorded By, (t) = Taken By, (c) = Cosigned By    Initials Name Provider Type    Nitza Boyce Occupational Therapist               Clinical Impression     Row Name 08/05/22 1052          Pain Assessment    Pretreatment Pain Rating 4/10  -     Posttreatment Pain Rating 4/10  -     Pain Location lower  -     Pain Location - back  -     Pain Intervention(s) Repositioned;Ambulation/increased activity  -AH     Row Name 08/05/22 1052          Plan of Care Review    Plan of Care Reviewed With patient  -     Progress no change  -     Outcome Evaluation OT evaluation  completed today.  Pt presents with weakness and decreased endurance to activity.  Pt able to get eob independently, completed toileting tasks with sba and walked 60' with cga using RW.  Pt is expected to benefit from skilled OT to improve her strength and independence with ADL tasks.  -     Row Name 08/05/22 1052          Therapy Assessment/Plan (OT)    Patient/Family Therapy Goal Statement (OT) d/c home with home [facility]  -     Rehab Potential (OT) good, to achieve stated therapy goals  -     Criteria for Skilled Therapeutic Interventions Met (OT) yes;skilled treatment is necessary  -     Therapy Frequency (OT) 3 times/wk  -     Row Name 08/05/22 1052          Therapy Plan Review/Discharge Plan (OT)    Anticipated Discharge Disposition (OT) home with home health  -[facility] Name 08/05/22 1052          Vital Signs    Pre SpO2 (%) 97  -     O2 Delivery Pre Treatment supplemental O2  -     Intra SpO2 (%) 94  -AH     O2 Delivery Intra Treatment room air  -     Post SpO2 (%) 95  -     O2 Delivery Post Treatment room air  -     Row Name 08/05/22 1052          Positioning and Restraints    Pre-Treatment Position in bed  -     Post Treatment Position chair  -     In Chair reclined;call light within reach;encouraged to call for assist  -           User Key  (r) = Recorded By, (t) = Taken By, (c) = Cosigned By    Initials Name Provider Type    Nitza Boyce Occupational Therapist               Outcome Measures     Row Name 08/05/22 1100          How much help from another is currently needed...    Putting on and taking off regular lower body clothing? 3  -AH     Bathing (including washing, rinsing, and drying) 3  -AH     Toileting (which includes using toilet bed pan or urinal) 4  -AH     Putting on and taking off regular upper body clothing 4  -AH     Taking care of personal grooming (such as brushing teeth) 4  -AH     Eating meals 4  -AH     AM-PAC 6 Clicks Score (OT) 22  -     Row Name  08/05/22 1100          Functional Assessment    Outcome Measure Options AM-PAC 6 Clicks Daily Activity (OT)  -           User Key  (r) = Recorded By, (t) = Taken By, (c) = Cosigned By    Initials Name Provider Type     Nitza Feliciano Occupational Therapist                Occupational Therapy Education                 Title: PT OT SLP Therapies (In Progress)     Topic: Occupational Therapy (In Progress)     Point: ADL training (Done)     Description:   Instruct learner(s) on proper safety adaptation and remediation techniques during self care or transfers.   Instruct in proper use of assistive devices.              Learning Progress Summary           Patient Acceptance, E,TB, VU by  at 8/5/2022 1101    Comment: Role of OT/POC                   Point: Home exercise program (Not Started)     Description:   Instruct learner(s) on appropriate technique for monitoring, assisting and/or progressing therapeutic exercises/activities.              Learner Progress:  Not documented in this visit.          Point: Precautions (Not Started)     Description:   Instruct learner(s) on prescribed precautions during self-care and functional transfers.              Learner Progress:  Not documented in this visit.          Point: Body mechanics (Not Started)     Description:   Instruct learner(s) on proper positioning and spine alignment during self-care, functional mobility activities and/or exercises.              Learner Progress:  Not documented in this visit.                      User Key     Initials Effective Dates Name Provider Type Discipline     06/16/21 -  Nitza Feliciano Occupational Therapist OT              OT Recommendation and Plan  Planned Therapy Interventions (OT): activity tolerance training, BADL retraining, patient/caregiver education/training, transfer/mobility retraining, strengthening exercise  Therapy Frequency (OT): 3 times/wk  Plan of Care Review  Plan of Care Reviewed With: patient  Progress: no  change  Outcome Evaluation: OT evaluation completed today.  Pt presents with weakness and decreased endurance to activity.  Pt able to get eob independently, completed toileting tasks with sba and walked 60' with cga using RW.  Pt is expected to benefit from skilled OT to improve her strength and independence with ADL tasks.     Time Calculation:    Time Calculation- OT     Row Name 08/05/22 1101             Time Calculation- OT    OT Start Time 1001  -      OT Received On 08/05/22  -      OT Goal Re-Cert Due Date 08/15/22  -              Untimed Charges    OT Eval/Re-eval Minutes 39  -AH              Total Minutes    Untimed Charges Total Minutes 39  -AH       Total Minutes 39  -AH            User Key  (r) = Recorded By, (t) = Taken By, (c) = Cosigned By    Initials Name Provider Type    Nitza Boyce Occupational Therapist              Therapy Charges for Today     Code Description Service Date Service Provider Modifiers Qty    [id number] HC OT EVAL LOW COMPLEXITY 3 8/5/2022 Nitza Feliciano GO 1               Nitza Feliciano  8/5/2022

## 2022-08-05 NOTE — DISCHARGE INSTRUCTIONS
You are being discharged home.     There was no active bleeding found in colonoscopy.  There were some diverticuli and internal hemorrhoids both of which could have caused the bleeding.    Your blood counts were good this morning.    Return to the hospital with any further bleeding or any other problems.    You may resume your home medications.    Follow a high fiber diet and avoid constipation.    Follow up with your PCP and GI doctors when returning back to [place].

## 2022-08-05 NOTE — PLAN OF CARE
Goal Outcome Evaluation:  Plan of Care Reviewed With: patient, daughter        Progress: improving  Patient clear for discharge per attending. Recommendations given along with discharge instructions.

## 2023-01-24 ENCOUNTER — HOSPITAL ENCOUNTER (EMERGENCY)
Dept: HOSPITAL 19 - COL.ER | Age: 73
LOS: 1 days | Discharge: ANOTHER HOSPITAL | End: 2023-01-25
Attending: FAMILY MEDICINE
Payer: MEDICARE

## 2023-01-24 VITALS — BODY MASS INDEX: 35.41 KG/M2 | HEIGHT: 60 IN | WEIGHT: 180.34 LBS

## 2023-01-24 DIAGNOSIS — Z87.891: ICD-10-CM

## 2023-01-24 DIAGNOSIS — N18.6: ICD-10-CM

## 2023-01-24 DIAGNOSIS — E11.22: ICD-10-CM

## 2023-01-24 DIAGNOSIS — Z99.2: ICD-10-CM

## 2023-01-24 DIAGNOSIS — R06.03: Primary | ICD-10-CM

## 2023-01-24 DIAGNOSIS — Z20.822: ICD-10-CM

## 2023-01-24 LAB
ALBUMIN SERPL-MCNC: 3.1 GM/DL (ref 3.4–4.8)
ALP SERPL-CCNC: 237 U/L (ref 40–150)
ALT SERPL-CCNC: 8 U/L (ref 0–55)
ANION GAP SERPL CALC-SCNC: 13 MMOL/L (ref 7–16)
AST SERPL-CCNC: 26 U/L (ref 5–34)
BASOPHILS # BLD: 0 K/MM3 (ref 0–0.2)
BASOPHILS NFR BLD AUTO: 0.6 % (ref 0–2)
BILIRUB SERPL-MCNC: 0.4 MG/DL (ref 0.2–1.2)
BUN SERPL-MCNC: 24 MG/DL (ref 10–20)
CALCIUM SERPL-MCNC: 9 MG/DL (ref 8.4–10.2)
CHLORIDE SERPL-SCNC: 97 MMOL/L (ref 98–107)
CO2 SERPL-SCNC: 26 MMOL/L (ref 23–31)
CREAT SERPL-SCNC: 2.71 MG/DL (ref 0.57–1.11)
EOSINOPHIL # BLD: 0.1 K/MM3 (ref 0–0.7)
EOSINOPHIL NFR BLD: 1.4 % (ref 0–4)
ERYTHROCYTE [DISTWIDTH] IN BLOOD BY AUTOMATED COUNT: 16.8 % (ref 11.5–14.5)
GLUCOSE SERPL-MCNC: 147 MG/DL (ref 70–99)
GRANULOCYTES # BLD AUTO: 67.3 % (ref 42.2–75.2)
HCT VFR BLD AUTO: 34.5 % (ref 37–47)
HGB BLD-MCNC: 10.2 G/DL (ref 12.5–16)
LYMPHOCYTES # BLD: 1.3 K/MM3 (ref 1.2–3.4)
LYMPHOCYTES NFR BLD: 20.2 % (ref 20–51)
MCH RBC QN AUTO: 28 PG (ref 27–31)
MCHC RBC AUTO-ENTMCNC: 30 G/DL (ref 33–37)
MCV RBC AUTO: 96 FL (ref 80–100)
MONOCYTES # BLD: 0.7 K/MM3 (ref 0.1–0.6)
MONOCYTES NFR BLD AUTO: 10.2 % (ref 1.7–9.3)
NEUTROPHILS # BLD: 4.4 K/MM3 (ref 1.4–6.5)
PH UR STRIP.AUTO: 6 [PH] (ref 5–8.5)
PLATELET # BLD AUTO: 234 K/MM3 (ref 130–400)
PMV BLD AUTO: 10.1 FL (ref 7.4–10.4)
POTASSIUM SERPL-SCNC: 4.3 MMOL/L (ref 3.5–4.5)
PROT SERPL-MCNC: 6.5 GM/DL (ref 6.2–8.1)
RBC # BLD AUTO: 3.59 M/MM3 (ref 4.1–5.3)
RBC # UR: (no result) /HPF (ref 0–2)
SODIUM SERPL-SCNC: 136 MMOL/L (ref 136–145)
SP GR UR STRIP.AUTO: 1.01 (ref 1–1.03)
SQUAMOUS # URNS: (no result) /HPF (ref 0–10)
UA DIPSTICK PNL UR STRIP.AUTO: (no result)
URN COLLECT METHOD CLASS: (no result)
UROBILINOGEN UR STRIP.AUTO-MCNC: 0.2 E.U/DL (ref 0.2–1)

## 2023-01-25 VITALS — SYSTOLIC BLOOD PRESSURE: 105 MMHG | HEART RATE: 80 BPM | DIASTOLIC BLOOD PRESSURE: 72 MMHG

## 2023-01-25 VITALS — TEMPERATURE: 98.5 F

## 2023-04-27 ENCOUNTER — HOSPITAL ENCOUNTER (OUTPATIENT)
Dept: HOSPITAL 19 - COL.ER | Age: 73
Setting detail: OBSERVATION
LOS: 3 days | Discharge: NURSING, REHAB OR LONG-TERM CARE | End: 2023-04-30
Attending: INTERNAL MEDICINE | Admitting: INTERNAL MEDICINE
Payer: MEDICARE

## 2023-04-27 VITALS — WEIGHT: 177.25 LBS | BODY MASS INDEX: 34.8 KG/M2 | HEIGHT: 60 IN

## 2023-04-27 VITALS — SYSTOLIC BLOOD PRESSURE: 122 MMHG | TEMPERATURE: 98.2 F | HEART RATE: 76 BPM | DIASTOLIC BLOOD PRESSURE: 68 MMHG

## 2023-04-27 DIAGNOSIS — Z99.2: ICD-10-CM

## 2023-04-27 DIAGNOSIS — Z79.01: ICD-10-CM

## 2023-04-27 DIAGNOSIS — K63.5: Primary | ICD-10-CM

## 2023-04-27 DIAGNOSIS — R19.5: ICD-10-CM

## 2023-04-27 DIAGNOSIS — I50.22: ICD-10-CM

## 2023-04-27 DIAGNOSIS — Z79.84: ICD-10-CM

## 2023-04-27 DIAGNOSIS — K57.31: ICD-10-CM

## 2023-04-27 DIAGNOSIS — N18.6: ICD-10-CM

## 2023-04-27 DIAGNOSIS — E11.319: ICD-10-CM

## 2023-04-27 DIAGNOSIS — I31.39: ICD-10-CM

## 2023-04-27 DIAGNOSIS — K64.1: ICD-10-CM

## 2023-04-27 DIAGNOSIS — N39.0: ICD-10-CM

## 2023-04-27 DIAGNOSIS — E11.22: ICD-10-CM

## 2023-04-27 LAB
ALBUMIN SERPL-MCNC: 3.1 GM/DL (ref 3.4–4.8)
ALP SERPL-CCNC: 126 U/L (ref 40–150)
ALT SERPL-CCNC: < 6 U/L (ref 0–55)
ANION GAP SERPL CALC-SCNC: 12 MMOL/L (ref 7–16)
AST SERPL-CCNC: 11 U/L (ref 5–34)
BILIRUB SERPL-MCNC: 0.5 MG/DL (ref 0.2–1.2)
BUN SERPL-MCNC: 32 MG/DL (ref 10–20)
CALCIUM SERPL-MCNC: 8.4 MG/DL (ref 8.4–10.2)
CHLORIDE SERPL-SCNC: 101 MMOL/L (ref 98–107)
CO2 SERPL-SCNC: 29 MMOL/L (ref 23–31)
CREAT SERPL-SCNC: 2.64 MG/DL (ref 0.57–1.11)
EOSINOPHIL NFR BLD: 4 % (ref 0–4)
ERYTHROCYTE [DISTWIDTH] IN BLOOD BY AUTOMATED COUNT: 14.6 % (ref 11.5–14.5)
GLUCOSE SERPL-MCNC: 221 MG/DL (ref 70–99)
HCT VFR BLD AUTO: 34.7 % (ref 37–47)
HGB BLD-MCNC: 11.1 G/DL (ref 12.5–16)
INR BLD: 1.5 (ref 0.8–3)
LYMPHOCYTES NFR BLD MANUAL: 19 % (ref 20–51)
MCH RBC QN AUTO: 29 PG (ref 27–31)
MCHC RBC AUTO-ENTMCNC: 32 G/DL (ref 33–37)
MCV RBC AUTO: 89 FL (ref 80–100)
MONOCYTES NFR BLD: 2 % (ref 1.7–9.3)
NEUTS BAND NFR BLD: 4 % (ref 0–10)
NEUTS SEG NFR BLD MANUAL: 71 % (ref 42–75.2)
OVALOCYTES BLD QL SMEAR: (no result)
PH UR STRIP.AUTO: 8.5 [PH] (ref 5–8.5)
PLATELET # BLD AUTO: 195 K/MM3 (ref 130–400)
PLATELET BLD QL SMEAR: NORMAL
PMV BLD AUTO: 9.8 FL (ref 7.4–10.4)
POTASSIUM SERPL-SCNC: 3.9 MMOL/L (ref 3.5–4.5)
PROT SERPL-MCNC: 5.6 GM/DL (ref 6.2–8.1)
PROTHROMBIN TIME: 17.6 SECONDS (ref 9.7–12.8)
RBC # BLD AUTO: 3.88 M/MM3 (ref 4.1–5.3)
RBC # UR STRIP.AUTO: (no result) /UL
RBC # UR: (no result) /HPF (ref 0–2)
SODIUM SERPL-SCNC: 142 MMOL/L (ref 136–145)
SP GR UR STRIP.AUTO: 1.01 (ref 1–1.03)
SQUAMOUS # URNS: (no result) /HPF (ref 0–10)
UA DIPSTICK PNL UR STRIP.AUTO: (no result)
URN COLLECT METHOD CLASS: (no result)
UROBILINOGEN UR STRIP.AUTO-MCNC: 0.2 E.U/DL (ref 0.2–1)

## 2023-04-27 PROCEDURE — C9113 INJ PANTOPRAZOLE SODIUM, VIA: HCPCS

## 2023-04-27 PROCEDURE — G0378 HOSPITAL OBSERVATION PER HR: HCPCS

## 2023-04-27 NOTE — NUR
Patient arrived to the floor at 2013 via ED cart, with IV to right forearm,
fistula on left forearm, with dialysis catheter on the right upper chest,
dressing CDI, admission assessment done, medrec done, hospital policies
orientated, instructed to be on NPO starting 12 midnight in preparation for
colonoscopy at 1500 tomorrow, had a bm looks like chunk chocolate milk with a
hint of blood, bowel prep facilitated, denies pain, denies further needs, call
light and personal items within reach, will continue to monitor.

## 2023-04-28 VITALS — SYSTOLIC BLOOD PRESSURE: 129 MMHG

## 2023-04-28 VITALS — SYSTOLIC BLOOD PRESSURE: 141 MMHG | HEART RATE: 84 BPM | DIASTOLIC BLOOD PRESSURE: 50 MMHG

## 2023-04-28 VITALS — HEART RATE: 80 BPM | SYSTOLIC BLOOD PRESSURE: 129 MMHG | DIASTOLIC BLOOD PRESSURE: 50 MMHG | TEMPERATURE: 97.9 F

## 2023-04-28 VITALS — HEART RATE: 81 BPM | SYSTOLIC BLOOD PRESSURE: 104 MMHG | DIASTOLIC BLOOD PRESSURE: 41 MMHG | TEMPERATURE: 98.9 F

## 2023-04-28 VITALS — DIASTOLIC BLOOD PRESSURE: 50 MMHG | HEART RATE: 82 BPM | TEMPERATURE: 97.5 F | SYSTOLIC BLOOD PRESSURE: 109 MMHG

## 2023-04-28 VITALS — DIASTOLIC BLOOD PRESSURE: 50 MMHG | HEART RATE: 85 BPM | TEMPERATURE: 97.8 F | SYSTOLIC BLOOD PRESSURE: 129 MMHG

## 2023-04-28 VITALS — SYSTOLIC BLOOD PRESSURE: 135 MMHG | DIASTOLIC BLOOD PRESSURE: 40 MMHG | HEART RATE: 83 BPM

## 2023-04-28 VITALS — DIASTOLIC BLOOD PRESSURE: 65 MMHG | HEART RATE: 84 BPM | SYSTOLIC BLOOD PRESSURE: 101 MMHG

## 2023-04-28 VITALS — DIASTOLIC BLOOD PRESSURE: 56 MMHG | TEMPERATURE: 97.6 F | HEART RATE: 73 BPM | SYSTOLIC BLOOD PRESSURE: 141 MMHG

## 2023-04-28 VITALS — SYSTOLIC BLOOD PRESSURE: 140 MMHG | DIASTOLIC BLOOD PRESSURE: 52 MMHG | TEMPERATURE: 97.6 F | HEART RATE: 83 BPM

## 2023-04-28 VITALS — HEART RATE: 81 BPM | DIASTOLIC BLOOD PRESSURE: 33 MMHG | SYSTOLIC BLOOD PRESSURE: 105 MMHG

## 2023-04-28 VITALS — SYSTOLIC BLOOD PRESSURE: 142 MMHG | HEART RATE: 84 BPM | DIASTOLIC BLOOD PRESSURE: 53 MMHG

## 2023-04-28 LAB
ALBUMIN SERPL-MCNC: 3 GM/DL (ref 3.4–4.8)
ANION GAP SERPL CALC-SCNC: 14 MMOL/L (ref 7–16)
BUN SERPL-MCNC: 32 MG/DL (ref 10–20)
CALCIUM SERPL-MCNC: 8.4 MG/DL (ref 8.4–10.2)
CHLORIDE SERPL-SCNC: 102 MMOL/L (ref 98–107)
CO2 SERPL-SCNC: 28 MMOL/L (ref 23–31)
CREAT SERPL-SCNC: 2.5 MG/DL (ref 0.57–1.11)
EOSINOPHIL NFR BLD: 2 % (ref 0–4)
ERYTHROCYTE [DISTWIDTH] IN BLOOD BY AUTOMATED COUNT: 14.6 % (ref 11.5–14.5)
GLUCOSE SERPL-MCNC: 120 MG/DL (ref 70–99)
HCT VFR BLD AUTO: 35.1 % (ref 37–47)
HCT VFR BLD AUTO: 35.1 % (ref 37–47)
HGB BLD-MCNC: 11.2 G/DL (ref 12.5–16)
HGB BLD-MCNC: 11.5 G/DL (ref 12.5–16)
LYMPHOCYTES NFR BLD MANUAL: 26 % (ref 20–51)
MCH RBC QN AUTO: 29 PG (ref 27–31)
MCHC RBC AUTO-ENTMCNC: 32 G/DL (ref 33–37)
MCV RBC AUTO: 92 FL (ref 80–100)
MONOCYTES NFR BLD: 7 % (ref 1.7–9.3)
NEUTS SEG NFR BLD MANUAL: 65 % (ref 42–75.2)
PHOSPHATE SERPL-MCNC: 4.5 MG/DL (ref 2.3–4.7)
PLATELET # BLD AUTO: 190 K/MM3 (ref 130–400)
PLATELET BLD QL SMEAR: NORMAL
PMV BLD AUTO: 10.1 FL (ref 7.4–10.4)
POTASSIUM SERPL-SCNC: 3.7 MMOL/L (ref 3.5–4.5)
RBC # BLD AUTO: 3.82 M/MM3 (ref 4.1–5.3)
SODIUM SERPL-SCNC: 144 MMOL/L (ref 136–145)

## 2023-04-28 NOTE — NUR
Report recieved from JON Hager. Patient back up from EGD/Colon at 1510.
Dynmap attached for post op vitals. VSS. Patient A&O. Patient denies any pain,
discomfort, SOA, or further needs at this time. Call light in reach. Fall
percautions in place.

## 2023-04-28 NOTE — NUR
Initial visit; Patient says she resides at [facility] and likes her
 there. Sasha says she has no spiritual needs but thanked  for
looking in on her.

## 2023-04-28 NOTE — NUR
met with Patient at bedside to conduct Care Managment Assessment
and discuss discharge Planing. Patient was admitted from [facility] and intends to
return. Patient reports that her son, Steven is tiffany best familial point of
contact P: [phone]. Patient reports to be established with Dr. Wood.
Patient denies the use of O2, endorses the use of a 4WW prior to admission.
Patient reports to have AD on file.
 
Discharge Plan: Return to [facility].

## 2023-04-28 NOTE — NUR
Patient has had an uneventful afternoon. Currenltly resting in bed. Denies any
pain, discomfort, SOA, or further needs at this time. Call light in reach.
Fall percautions in place.

## 2023-04-28 NOTE — NUR
appears to be dozing, awakened and full assessment completed, see
interventions for further info, will plan for dialysis at 0830, denies needs
at this time

## 2023-04-28 NOTE — NUR
Patient was pretty tired from all the procedures done for her today, reports
headache and tylenol given per request, INT to right forearm infusing well,
reports that she still pooping blood, denies further needs, call light and
personal items within reach, will continue to monitor.

## 2023-04-28 NOTE — NUR
returned per WC from dialysis, consent for EGD and colonoscopy signed, endo
nurse and to endoscopy for procedure, patient remains alert and oriented,
ambulates with steady gait

## 2023-04-28 NOTE — NUR
she remains in dialysis, son called to check on patient and said his mom is
very concerned about her left eye, JENNY Haro called and notified of
pateint's concern, she was aware of this and will talk with Dr Stuart

## 2023-04-29 VITALS — SYSTOLIC BLOOD PRESSURE: 153 MMHG | TEMPERATURE: 98 F | DIASTOLIC BLOOD PRESSURE: 67 MMHG | HEART RATE: 74 BPM

## 2023-04-29 VITALS — DIASTOLIC BLOOD PRESSURE: 53 MMHG | HEART RATE: 76 BPM | TEMPERATURE: 98.4 F | SYSTOLIC BLOOD PRESSURE: 140 MMHG

## 2023-04-29 VITALS — SYSTOLIC BLOOD PRESSURE: 140 MMHG | HEART RATE: 75 BPM | TEMPERATURE: 98.3 F | DIASTOLIC BLOOD PRESSURE: 43 MMHG

## 2023-04-29 VITALS — HEART RATE: 76 BPM | DIASTOLIC BLOOD PRESSURE: 43 MMHG | SYSTOLIC BLOOD PRESSURE: 127 MMHG | TEMPERATURE: 98 F

## 2023-04-29 VITALS — TEMPERATURE: 98.4 F | SYSTOLIC BLOOD PRESSURE: 138 MMHG | DIASTOLIC BLOOD PRESSURE: 57 MMHG | HEART RATE: 72 BPM

## 2023-04-29 VITALS — SYSTOLIC BLOOD PRESSURE: 140 MMHG

## 2023-04-29 VITALS — SYSTOLIC BLOOD PRESSURE: 153 MMHG

## 2023-04-29 VITALS — TEMPERATURE: 98 F | DIASTOLIC BLOOD PRESSURE: 52 MMHG | HEART RATE: 72 BPM | SYSTOLIC BLOOD PRESSURE: 135 MMHG

## 2023-04-29 VITALS — SYSTOLIC BLOOD PRESSURE: 135 MMHG

## 2023-04-29 LAB
ALBUMIN SERPL-MCNC: 3.1 GM/DL (ref 3.4–4.8)
ANION GAP SERPL CALC-SCNC: 10 MMOL/L (ref 7–16)
BASOPHILS # BLD: 0 K/MM3 (ref 0–0.2)
BASOPHILS NFR BLD AUTO: 0.3 % (ref 0–2)
BUN SERPL-MCNC: 15 MG/DL (ref 10–20)
CALCIUM SERPL-MCNC: 8.9 MG/DL (ref 8.4–10.2)
CHLORIDE SERPL-SCNC: 105 MMOL/L (ref 98–107)
CO2 SERPL-SCNC: 28 MMOL/L (ref 23–31)
CREAT SERPL-SCNC: 1.91 MG/DL (ref 0.57–1.11)
EOSINOPHIL # BLD: 0.3 K/MM3 (ref 0–0.7)
EOSINOPHIL NFR BLD: 4.4 % (ref 0–4)
ERYTHROCYTE [DISTWIDTH] IN BLOOD BY AUTOMATED COUNT: 14.8 % (ref 11.5–14.5)
GLUCOSE SERPL-MCNC: 153 MG/DL (ref 70–99)
GRANULOCYTES # BLD AUTO: 59.7 % (ref 42.2–75.2)
HCT VFR BLD AUTO: 35.6 % (ref 37–47)
HGB BLD-MCNC: 11.1 G/DL (ref 12.5–16)
LYMPHOCYTES # BLD: 1.4 K/MM3 (ref 1.2–3.4)
LYMPHOCYTES NFR BLD: 23.4 % (ref 20–51)
MCH RBC QN AUTO: 29 PG (ref 27–31)
MCHC RBC AUTO-ENTMCNC: 31 G/DL (ref 33–37)
MCV RBC AUTO: 93 FL (ref 80–100)
MONOCYTES # BLD: 0.6 K/MM3 (ref 0.1–0.6)
MONOCYTES NFR BLD AUTO: 10.8 % (ref 1.7–9.3)
NEUTROPHILS # BLD: 3.5 K/MM3 (ref 1.4–6.5)
PHOSPHATE SERPL-MCNC: 4 MG/DL (ref 2.3–4.7)
PLATELET # BLD AUTO: 180 K/MM3 (ref 130–400)
PMV BLD AUTO: 10.3 FL (ref 7.4–10.4)
POTASSIUM SERPL-SCNC: 3.9 MMOL/L (ref 3.5–4.5)
RBC # BLD AUTO: 3.84 M/MM3 (ref 4.1–5.3)
SODIUM SERPL-SCNC: 143 MMOL/L (ref 136–145)

## 2023-04-29 NOTE — NUR
PATIENT ALERT AND ORIENTED X4. VSS. PATIENT DENIES ANY PAIN AT THIS TIME. IV
TO RIGHT FA, INT, FLUSHES WELL. DIALYSIS CATH TO RIGHT UPPER CHEST, TEGADERM
CDI. PATIENT CONTINUES TO HAVE BLOOD IN STOOL. PATIENT COMPLAINS OF LEFT EYE
BLURRED VISION WITH FLOATERS. PATIENT EATING BREAKFAST AND RESTING IN BED.
CALL LIGHT IN REACH.

## 2023-04-29 NOTE — NUR
Shift asssessment performed- see documentation. Pt is alert and oriented. SHe
is on room air. Pt states that she is in a 6/10 pain in her back. PRN Tylenol
administered as ordered- see eMar. I assisted pt to the bathroom for her to
void. She states that when she stands, she feels the blood start to rush out
of her. She did drip blood on the floor when she had taken her brief off.
however there was minimal-moderate amount of blood present in her peripad. SHe
was assisted back to bed and was comfortable. At about 2240 she called
complaining of pain in in her right leg/groin area. She states that it is a
shooting pain that comes and goes regardless of what she is doing. The area is
free of any visual markers. I applied heat to the area and she stated that
this help with pain relief. I recommended that she get up and walk. She states
that the pain is now relieved.

## 2023-04-30 VITALS — TEMPERATURE: 98.2 F | HEART RATE: 70 BPM | SYSTOLIC BLOOD PRESSURE: 145 MMHG | DIASTOLIC BLOOD PRESSURE: 53 MMHG

## 2023-04-30 VITALS — SYSTOLIC BLOOD PRESSURE: 132 MMHG | DIASTOLIC BLOOD PRESSURE: 41 MMHG | TEMPERATURE: 98 F | HEART RATE: 70 BPM

## 2023-04-30 VITALS — SYSTOLIC BLOOD PRESSURE: 153 MMHG

## 2023-04-30 VITALS — TEMPERATURE: 98.2 F | HEART RATE: 78 BPM | DIASTOLIC BLOOD PRESSURE: 56 MMHG | SYSTOLIC BLOOD PRESSURE: 126 MMHG

## 2023-04-30 VITALS — SYSTOLIC BLOOD PRESSURE: 132 MMHG

## 2023-04-30 VITALS — SYSTOLIC BLOOD PRESSURE: 126 MMHG

## 2023-04-30 LAB
ALBUMIN SERPL-MCNC: 3 GM/DL (ref 3.4–4.8)
ANION GAP SERPL CALC-SCNC: 11 MMOL/L (ref 7–16)
BASOPHILS # BLD: 0 K/MM3 (ref 0–0.2)
BASOPHILS NFR BLD AUTO: 0.4 % (ref 0–2)
BUN SERPL-MCNC: 23 MG/DL (ref 10–20)
CALCIUM SERPL-MCNC: 8.6 MG/DL (ref 8.4–10.2)
CHLORIDE SERPL-SCNC: 107 MMOL/L (ref 98–107)
CO2 SERPL-SCNC: 25 MMOL/L (ref 23–31)
CREAT SERPL-SCNC: 2.39 MG/DL (ref 0.57–1.11)
EOSINOPHIL # BLD: 0.3 K/MM3 (ref 0–0.7)
EOSINOPHIL NFR BLD: 4.6 % (ref 0–4)
ERYTHROCYTE [DISTWIDTH] IN BLOOD BY AUTOMATED COUNT: 14.8 % (ref 11.5–14.5)
GLUCOSE SERPL-MCNC: 148 MG/DL (ref 70–99)
GRANULOCYTES # BLD AUTO: 55.7 % (ref 42.2–75.2)
HCT VFR BLD AUTO: 34.1 % (ref 37–47)
HGB BLD-MCNC: 10.8 G/DL (ref 12.5–16)
LYMPHOCYTES # BLD: 1.5 K/MM3 (ref 1.2–3.4)
LYMPHOCYTES NFR BLD: 26 % (ref 20–51)
MCH RBC QN AUTO: 29 PG (ref 27–31)
MCHC RBC AUTO-ENTMCNC: 32 G/DL (ref 33–37)
MCV RBC AUTO: 91 FL (ref 80–100)
MONOCYTES # BLD: 0.7 K/MM3 (ref 0.1–0.6)
MONOCYTES NFR BLD AUTO: 12.2 % (ref 1.7–9.3)
NEUTROPHILS # BLD: 3.2 K/MM3 (ref 1.4–6.5)
PHOSPHATE SERPL-MCNC: 4.8 MG/DL (ref 2.3–4.7)
PLATELET # BLD AUTO: 170 K/MM3 (ref 130–400)
PMV BLD AUTO: 9.9 FL (ref 7.4–10.4)
POTASSIUM SERPL-SCNC: 4 MMOL/L (ref 3.5–4.5)
RBC # BLD AUTO: 3.73 M/MM3 (ref 4.1–5.3)
SODIUM SERPL-SCNC: 143 MMOL/L (ref 136–145)

## 2023-04-30 NOTE — NUR
PATIENT ALERT AND ORIENTED X4. VSS. PATIENT HERE FOR GI BLEED/EGD/COLON.
PATIENT REPORTS BLOODY STOOL THIS AM. PATIENT REPORTS INCREASED PAIN IN
ABDOMEN. IV TO RIGHT FA, INT FLUSHES WELL. LEFT FA FISTULA. RIGHT UPPER CHEST
DIALYSIS CATH, DRESSING CDI. PATIENT RESTING IN CHAIR, CALL LIGHT IN REACH.

## 2023-04-30 NOTE — NUR
DISCHARGE PACKET/TRANSFER PACKET GIVEN TO TRANSPORTER. IV DC'D. REPORT CALLED
TO NURSE AT [facility]. PATIENT AND BELONGINGS ESCORTED OUT VIA WHEELCHAIR.

## 2024-08-14 ENCOUNTER — HOSPITAL ENCOUNTER (INPATIENT)
Dept: HOSPITAL 19 - COL.ER | Age: 74
LOS: 2 days | Discharge: NURSING, REHAB OR LONG-TERM CARE | DRG: 189 | End: 2024-08-16
Attending: INTERNAL MEDICINE | Admitting: INTERNAL MEDICINE
Payer: MEDICARE

## 2024-08-14 VITALS — BODY MASS INDEX: 25.28 KG/M2 | WEIGHT: 128.75 LBS | HEIGHT: 60 IN

## 2024-08-14 DIAGNOSIS — Z86.73: ICD-10-CM

## 2024-08-14 DIAGNOSIS — Z79.899: ICD-10-CM

## 2024-08-14 DIAGNOSIS — N18.6: ICD-10-CM

## 2024-08-14 DIAGNOSIS — E78.5: ICD-10-CM

## 2024-08-14 DIAGNOSIS — G47.33: ICD-10-CM

## 2024-08-14 DIAGNOSIS — D64.9: ICD-10-CM

## 2024-08-14 DIAGNOSIS — K21.9: ICD-10-CM

## 2024-08-14 DIAGNOSIS — I25.10: ICD-10-CM

## 2024-08-14 DIAGNOSIS — I44.7: ICD-10-CM

## 2024-08-14 DIAGNOSIS — I42.8: ICD-10-CM

## 2024-08-14 DIAGNOSIS — J96.21: Primary | ICD-10-CM

## 2024-08-14 DIAGNOSIS — Z79.82: ICD-10-CM

## 2024-08-14 DIAGNOSIS — I50.22: ICD-10-CM

## 2024-08-14 DIAGNOSIS — R91.8: ICD-10-CM

## 2024-08-14 DIAGNOSIS — I13.2: ICD-10-CM

## 2024-08-14 DIAGNOSIS — Z20.822: ICD-10-CM

## 2024-08-14 DIAGNOSIS — E11.22: ICD-10-CM

## 2024-08-14 DIAGNOSIS — E03.9: ICD-10-CM

## 2024-08-14 DIAGNOSIS — Z87.891: ICD-10-CM

## 2024-08-14 DIAGNOSIS — I95.1: ICD-10-CM

## 2024-08-14 DIAGNOSIS — E86.0: ICD-10-CM

## 2024-08-14 DIAGNOSIS — Z23: ICD-10-CM

## 2024-08-14 DIAGNOSIS — I95.3: ICD-10-CM

## 2024-08-14 DIAGNOSIS — Z91.040: ICD-10-CM

## 2024-08-14 DIAGNOSIS — D50.9: ICD-10-CM

## 2024-08-14 DIAGNOSIS — Z99.2: ICD-10-CM

## 2024-08-14 DIAGNOSIS — G20.A1: ICD-10-CM

## 2024-08-14 LAB
ALBUMIN SERPL-MCNC: 3.4 G/DL (ref 3.4–4.8)
ALP SERPL-CCNC: 185 U/L (ref 40–150)
ALT SERPL-CCNC: 13 U/L (ref 0–55)
ANION GAP SERPL CALC-SCNC: 15 MMOL/L (ref 7–16)
APPEARANCE UR: CLEAR
AST SERPL-CCNC: 218 U/L (ref 5–34)
BASOPHILS # BLD: 0 K/MM3 (ref 0–0.2)
BASOPHILS NFR BLD AUTO: 0.5 % (ref 0–2)
BILIRUB SERPL-MCNC: 1.1 MG/DL (ref 0.2–1.2)
BUN SERPL-MCNC: 18 MG/DL (ref 10–20)
CALCIUM SERPL-MCNC: 8.8 MG/DL (ref 8.4–10.2)
CHLORIDE SERPL-SCNC: 97 MEQ/L (ref 98–107)
COLOR UR AUTO: YELLOW
CREAT SERPL-SCNC: 2.13 MG/DL (ref 0.57–1.11)
CRP SERPL-MCNC: 3.15 MG/DL (ref 0–0.5)
EOSINOPHIL # BLD: 0.2 K/MM3 (ref 0–0.7)
EOSINOPHIL NFR BLD: 3.8 % (ref 0–4)
ERYTHROCYTE [DISTWIDTH] IN BLOOD BY AUTOMATED COUNT: 14.4 % (ref 11.5–14.5)
GLUCOSE SERPL-MCNC: 101 MG/DL (ref 70–99)
GLUCOSE UR QL STRIP.AUTO: NEGATIVE
GRANULOCYTES # BLD AUTO: 63.4 % (ref 42.2–75.2)
HCT VFR BLD AUTO: 37.1 % (ref 37–47)
HGB BLD-MCNC: 12 G/DL (ref 12.5–16)
KETONES UR STRIP.AUTO-MCNC: NEGATIVE MG/DL
LYMPHOCYTES # BLD: 1 K/MM3 (ref 1.2–3.4)
LYMPHOCYTES NFR BLD: 17.9 % (ref 20–51)
MCH RBC QN AUTO: 31 PG (ref 27–31)
MCHC RBC AUTO-ENTMCNC: 32 G/DL (ref 33–37)
MCV RBC AUTO: 96 FL (ref 80–100)
MONOCYTES # BLD: 0.8 K/MM3 (ref 0.1–0.6)
MONOCYTES NFR BLD AUTO: 13.9 % (ref 1.7–9.3)
NEUTROPHILS # BLD: 3.5 K/MM3 (ref 1.4–6.5)
NITRATE UR-MCNC: NEGATIVE UG/ML
PH UR STRIP.AUTO: 7.5 [PH] (ref 5–8.5)
PLATELET # BLD AUTO: 145 K/MM3 (ref 130–400)
PMV BLD AUTO: 10.3 FL (ref 7.4–10.4)
POTASSIUM SERPL-SCNC: 3.6 MEQ/L (ref 3.5–4.5)
PROT SERPL-MCNC: 6.8 G/DL (ref 6.2–8.1)
RBC # BLD AUTO: 3.87 M/MM3 (ref 4.1–5.3)
RBC # UR STRIP.AUTO: NEGATIVE /UL
SODIUM SERPL-SCNC: 139 MEQ/L (ref 136–145)
SP GR UR STRIP.AUTO: 1.01 (ref 1–1.03)
TROPONIN I SERPL-MCNC: 0.01 NG/ML (ref 0–0.03)
UA DIPSTICK PNL UR STRIP.AUTO: (no result)
URN COLLECT METHOD CLASS: (no result)
UROBILINOGEN UR STRIP.AUTO-MCNC: 0.2 E.U/DL (ref 0.2–1)

## 2024-08-15 VITALS — DIASTOLIC BLOOD PRESSURE: 35 MMHG | HEART RATE: 71 BPM | SYSTOLIC BLOOD PRESSURE: 96 MMHG | TEMPERATURE: 97.9 F

## 2024-08-15 VITALS — HEART RATE: 81 BPM | SYSTOLIC BLOOD PRESSURE: 97 MMHG | DIASTOLIC BLOOD PRESSURE: 54 MMHG | TEMPERATURE: 98.1 F

## 2024-08-15 VITALS — SYSTOLIC BLOOD PRESSURE: 134 MMHG

## 2024-08-15 VITALS — HEART RATE: 72 BPM | DIASTOLIC BLOOD PRESSURE: 72 MMHG | SYSTOLIC BLOOD PRESSURE: 130 MMHG | TEMPERATURE: 98.3 F

## 2024-08-15 VITALS — SYSTOLIC BLOOD PRESSURE: 134 MMHG | TEMPERATURE: 98.2 F | DIASTOLIC BLOOD PRESSURE: 72 MMHG | HEART RATE: 76 BPM

## 2024-08-15 VITALS — HEART RATE: 71 BPM | SYSTOLIC BLOOD PRESSURE: 110 MMHG | TEMPERATURE: 98.4 F | DIASTOLIC BLOOD PRESSURE: 58 MMHG

## 2024-08-15 VITALS — DIASTOLIC BLOOD PRESSURE: 70 MMHG | SYSTOLIC BLOOD PRESSURE: 116 MMHG | TEMPERATURE: 97.9 F | HEART RATE: 76 BPM

## 2024-08-15 VITALS — SYSTOLIC BLOOD PRESSURE: 112 MMHG | DIASTOLIC BLOOD PRESSURE: 74 MMHG | HEART RATE: 70 BPM | TEMPERATURE: 97.9 F

## 2024-08-15 VITALS — SYSTOLIC BLOOD PRESSURE: 110 MMHG

## 2024-08-15 NOTE — NUR
met with pt to discuss discharge planning. She confirms to
reside at [facility]. She sees Dr. Nur for PCP needs and obtains
medications from [facility] with no difficulties. She reports her son, Anton
[phone] as her contact and DPOA-HC. FRANKI verified this on file. She reports
to be indpendent with ADLS and uses a FWW and oxygen with her CPAP at night
for DME. She intends to return to [facility] upon discharge.
 
FRANKI emailed updates to Armando at [facility].
 
Discharge Plan: return to [facility]

## 2024-08-15 NOTE — NUR
pt arrived via wheelchair to room 324. assessment and intake completed at this
time. pt a&ox4. pt reports 7/10 pain located in her back. prn tyelnol
administered at this time. pt denies nausea and sob but arrives with 2L per
nc. pt denies chest pain and dizziness at this time but reports headache. int
in right ac. dialysis fistula located in left upper arm, covered with gauze,
c,d,i. fall precautions in place. call light within reach. no further needs at
this time.

## 2024-08-15 NOTE — NUR
D:   stopped by room on rounds.
 
A:  Pt was resting and content with family in the room.  Pt has no needs right
now.
 
P:   informed pt that if she needed anything from the  area to
let her nurse know.   will follow up as needed.

## 2024-08-15 NOTE — NUR
PT A&O X4 LAYING IN BED. VSS ON 2L/NC. PT ON TELE. AMBULATED TO RESTROOM WITH
SBA. DENYING PAIN OR N/V. PT WEARING LIFE VEST. DENYING NEEDS. CALL LIGHT IN
REACH & FALL PRECAUTIONS IN PLACE

## 2024-08-15 NOTE — NUR
PT ALERT AND EATING BREAKFAST IN BED. MEDS GIVEN PER ORDER. HEAD TO TOE
ASSESSMENT COMPLETE. PT DENIES PAIN. O2 ON 2 L NASAL CANNULA. FALL PRECAUTIONS
IN PLACE. CALL LIGHT WITHIN REACH. NO FURTHER NEEDS.

## 2024-08-15 NOTE — NUR
Pt arrived from ED, alert and oriented x4. deies chest pain and shortness of
breath. initial assessment performed. IV in RAC is patent, REAGAN with AV
fistula, sites CDI left extremity restricted. life vest on pt at this time. pt
 has no further needs, questions, or concerns. fall precautions in place, call
light within reach. this RN verified MADELEINE Campbell assessment and admission at
this time. pt needs met at this time.

## 2024-08-16 VITALS — SYSTOLIC BLOOD PRESSURE: 106 MMHG | HEART RATE: 66 BPM | DIASTOLIC BLOOD PRESSURE: 62 MMHG

## 2024-08-16 VITALS — DIASTOLIC BLOOD PRESSURE: 76 MMHG | SYSTOLIC BLOOD PRESSURE: 139 MMHG | HEART RATE: 66 BPM | TEMPERATURE: 98 F

## 2024-08-16 VITALS — SYSTOLIC BLOOD PRESSURE: 130 MMHG

## 2024-08-16 VITALS — DIASTOLIC BLOOD PRESSURE: 81 MMHG | TEMPERATURE: 97.7 F | SYSTOLIC BLOOD PRESSURE: 130 MMHG | HEART RATE: 74 BPM

## 2024-08-16 VITALS — SYSTOLIC BLOOD PRESSURE: 139 MMHG

## 2024-08-16 LAB
ANION GAP SERPL CALC-SCNC: 15 MMOL/L (ref 7–16)
BUN SERPL-MCNC: 49 MG/DL (ref 10–20)
CALCIUM SERPL-MCNC: 9.6 MG/DL (ref 8.4–10.2)
CHLORIDE SERPL-SCNC: 99 MEQ/L (ref 98–107)
CREAT SERPL-SCNC: 3.57 MG/DL (ref 0.57–1.11)
GLUCOSE SERPL-MCNC: 98 MG/DL (ref 70–99)
POTASSIUM SERPL-SCNC: 4 MEQ/L (ref 3.5–4.5)
SODIUM SERPL-SCNC: 139 MEQ/L (ref 136–145)

## 2024-08-16 PROCEDURE — 5A1D70Z PERFORMANCE OF URINARY FILTRATION, INTERMITTENT, LESS THAN 6 HOURS PER DAY: ICD-10-PCS | Performed by: INTERNAL MEDICINE

## 2024-08-16 NOTE — NUR
attended clinical rounding and was informed pt can discharge
after diaylsis today. SW faxed discharge orders to [facility] who can transport at
3:15pm. FRANKI informed Unit Clerk and JON Badillo of transport time and they were
agreeable.
 
Discharge Plan: 3:15pm return to [facility]

## 2024-08-16 NOTE — NUR
PT DRESSED IN PERSON CLOTHING. IV TO RIGHT FOREARM DISCONTINUED. PRESSURE
DRESSING APPLIED. AWAITING TRANSPORTATION TO [facility]. CALL LIGHT
WITHIN REACH. NO FURTHER NEEDS.

## 2024-08-16 NOTE — NUR
PT ALERT AND RESTING IN BED. MEDS GIVEN PER ORDER. PT DENIES PAIN. HEAD TO TOE
ASSESSMENT COMPLETE. PLAN FOR DIALYSIS LATER TODAY. BED IN LOWEST POSITION.
CALL LIGHT WITHIN REACH. NO FURTHER NEEDS.

## 2024-08-16 NOTE — NUR
PT RESTING IN BED WITH UNLABORED RESP. INT TO AC NOT PATENT, NEW INT TO RT
FOREARM STARTED BY ICU NURSE INEZ. PT DENYING OTHER NEEDS THIS MORNING. CALL
LIGHT IN REACH

## 2024-08-16 NOTE — NUR
Dialysis note
 
Pt arrived to tx via WC goal set for 1 kg. pt taken off tx 30 min early d/t
 clotting in keshav chamber. Blood returned. pt dcd back to room without
compaints

## 2024-10-20 ENCOUNTER — HOSPITAL ENCOUNTER (EMERGENCY)
Dept: HOSPITAL 19 - COL.ER | Age: 74
LOS: 1 days | Discharge: HOME | End: 2024-10-21
Attending: EMERGENCY MEDICINE
Payer: MEDICARE

## 2024-10-20 VITALS — BODY MASS INDEX: 32.46 KG/M2 | WEIGHT: 165.35 LBS | HEIGHT: 60 IN

## 2024-10-20 VITALS — TEMPERATURE: 98.2 F

## 2024-10-20 DIAGNOSIS — R11.2: Primary | ICD-10-CM

## 2024-10-20 DIAGNOSIS — R07.9: ICD-10-CM

## 2024-10-20 LAB
ALBUMIN SERPL-MCNC: 3.7 G/DL (ref 3.4–4.8)
ALP SERPL-CCNC: 98 U/L (ref 40–150)
ALT SERPL-CCNC: < 6 U/L (ref 0–55)
ANION GAP SERPL CALC-SCNC: 18 MMOL/L (ref 7–16)
APPEARANCE UR: (no result)
APTT PPP: 18 SECONDS (ref 26–37)
AST SERPL-CCNC: 16 U/L (ref 5–34)
BACTERIA #/AREA URNS HPF: (no result) /HPF
BASOPHILS # BLD: 0 K/MM3 (ref 0–0.2)
BASOPHILS NFR BLD AUTO: 0.5 % (ref 0–2)
BILIRUB SERPL-MCNC: 0.6 MG/DL (ref 0.2–1.2)
BUN SERPL-MCNC: 56 MG/DL (ref 10–20)
CALCIUM SERPL-MCNC: 9.5 MG/DL (ref 8.4–10.2)
CHLORIDE SERPL-SCNC: 98 MEQ/L (ref 98–107)
COLOR UR AUTO: YELLOW
CREAT SERPL-SCNC: 3.97 MG/DL (ref 0.57–1.11)
CRP SERPL-MCNC: 0.15 MG/DL (ref 0–0.5)
DEPRECATED D DIMER PPP IA-ACNC: 690 NG/MLDDU (ref 200–230)
EOSINOPHIL # BLD: 0.1 K/MM3 (ref 0–0.7)
EOSINOPHIL NFR BLD: 2 % (ref 0–4)
ERYTHROCYTE [DISTWIDTH] IN BLOOD BY AUTOMATED COUNT: 14 % (ref 11.5–14.5)
GLUCOSE SERPL-MCNC: 80 MG/DL (ref 70–99)
GLUCOSE UR QL STRIP.AUTO: NEGATIVE
GRANULOCYTES # BLD AUTO: 59.3 % (ref 42.2–75.2)
HCT VFR BLD AUTO: 35.3 % (ref 37–47)
HGB BLD-MCNC: 11.7 G/DL (ref 12.5–16)
INR BLD: 1 (ref 0.8–3)
KETONES UR STRIP.AUTO-MCNC: (no result) MG/DL
LIPASE SERPL-CCNC: 21 U/L (ref 8–78)
LYMPHOCYTES # BLD: 1.3 K/MM3 (ref 1.2–3.4)
LYMPHOCYTES NFR BLD: 24.3 % (ref 20–51)
MCH RBC QN AUTO: 31 PG (ref 27–31)
MCHC RBC AUTO-ENTMCNC: 33 G/DL (ref 33–37)
MCV RBC AUTO: 94 FL (ref 80–100)
MONOCYTES # BLD: 0.7 K/MM3 (ref 0.1–0.6)
MONOCYTES NFR BLD AUTO: 13 % (ref 1.7–9.3)
MUCOUS THREADS #/AREA URNS LPF: PRESENT /[LPF]
NEUTROPHILS # BLD: 3.3 K/MM3 (ref 1.4–6.5)
NITRATE UR-MCNC: NEGATIVE UG/ML
PH UR STRIP.AUTO: 5 [PH] (ref 5–8.5)
PLATELET # BLD AUTO: 185 K/MM3 (ref 130–400)
PMV BLD AUTO: 10.7 FL (ref 7.4–10.4)
POTASSIUM SERPL-SCNC: 4.2 MEQ/L (ref 3.5–4.5)
PROT SERPL-MCNC: 6.7 G/DL (ref 6.2–8.1)
PROTHROMBIN TIME: 10.5 SECONDS (ref 9.7–12.8)
RBC # BLD AUTO: 3.75 M/MM3 (ref 4.1–5.3)
RBC # UR STRIP.AUTO: NEGATIVE /UL
RBC # UR: (no result) /HPF (ref 0–2)
SODIUM SERPL-SCNC: 139 MEQ/L (ref 136–145)
SP GR UR STRIP.AUTO: 1.01 (ref 1–1.03)
SQUAMOUS # URNS: (no result) /HPF (ref 0–10)
TROPONIN I SERPL-MCNC: 0.02 NG/ML (ref 0–0.03)
UA DIPSTICK PNL UR STRIP.AUTO: (no result)
URN COLLECT METHOD CLASS: (no result)
UROBILINOGEN UR STRIP.AUTO-MCNC: 0.2 E.U/DL (ref 0.2–1)

## 2024-10-21 VITALS — DIASTOLIC BLOOD PRESSURE: 56 MMHG | SYSTOLIC BLOOD PRESSURE: 134 MMHG | HEART RATE: 74 BPM

## 2024-10-31 ENCOUNTER — HOSPITAL ENCOUNTER (OUTPATIENT)
Dept: HOSPITAL 19 - COL.RAD | Age: 74
End: 2024-10-31
Payer: MEDICARE

## 2024-10-31 VITALS — HEART RATE: 75 BPM | SYSTOLIC BLOOD PRESSURE: 101 MMHG | DIASTOLIC BLOOD PRESSURE: 62 MMHG

## 2024-10-31 VITALS — SYSTOLIC BLOOD PRESSURE: 133 MMHG | HEART RATE: 72 BPM | DIASTOLIC BLOOD PRESSURE: 83 MMHG

## 2024-10-31 VITALS — WEIGHT: 167.11 LBS | HEIGHT: 60 IN | BODY MASS INDEX: 32.81 KG/M2

## 2024-10-31 VITALS — TEMPERATURE: 97.5 F | DIASTOLIC BLOOD PRESSURE: 75 MMHG | SYSTOLIC BLOOD PRESSURE: 133 MMHG | HEART RATE: 85 BPM

## 2024-10-31 VITALS — DIASTOLIC BLOOD PRESSURE: 57 MMHG | HEART RATE: 76 BPM | SYSTOLIC BLOOD PRESSURE: 87 MMHG

## 2024-10-31 VITALS — HEART RATE: 77 BPM | DIASTOLIC BLOOD PRESSURE: 64 MMHG | SYSTOLIC BLOOD PRESSURE: 99 MMHG

## 2024-10-31 DIAGNOSIS — I25.119: Primary | ICD-10-CM

## 2024-11-29 ENCOUNTER — HOSPITAL ENCOUNTER (EMERGENCY)
Dept: HOSPITAL 19 - COL.ER | Age: 74
Discharge: HOME | End: 2024-11-29
Payer: MEDICARE

## 2024-11-29 VITALS — DIASTOLIC BLOOD PRESSURE: 68 MMHG | SYSTOLIC BLOOD PRESSURE: 105 MMHG | HEART RATE: 76 BPM

## 2024-11-29 VITALS — HEIGHT: 60 IN | BODY MASS INDEX: 32.46 KG/M2 | WEIGHT: 165.35 LBS

## 2024-11-29 VITALS — TEMPERATURE: 97.1 F

## 2024-11-29 DIAGNOSIS — Z86.73: ICD-10-CM

## 2024-11-29 DIAGNOSIS — R07.81: Primary | ICD-10-CM

## 2024-11-29 DIAGNOSIS — E11.22: ICD-10-CM

## 2024-11-29 DIAGNOSIS — I50.9: ICD-10-CM

## 2024-11-29 DIAGNOSIS — Z99.2: ICD-10-CM

## 2024-11-29 DIAGNOSIS — Z95.0: ICD-10-CM

## 2024-11-29 DIAGNOSIS — N18.6: ICD-10-CM

## 2024-11-29 DIAGNOSIS — I13.2: ICD-10-CM

## 2024-11-29 LAB
ALBUMIN SERPL-MCNC: 4.1 G/DL (ref 3.4–4.8)
ALP SERPL-CCNC: 111 U/L (ref 40–150)
ALT SERPL-CCNC: 6 U/L (ref 0–55)
ANION GAP SERPL CALC-SCNC: 13 MMOL/L (ref 7–16)
APTT PPP: 28 SECONDS (ref 26–37)
AST SERPL-CCNC: 12 U/L (ref 5–34)
BASOPHILS # BLD: 0 K/MM3 (ref 0–0.2)
BASOPHILS NFR BLD AUTO: 0.5 % (ref 0–2)
BILIRUB SERPL-MCNC: 0.6 MG/DL (ref 0.2–1.2)
BUN SERPL-MCNC: 59 MG/DL (ref 10–20)
CALCIUM SERPL-MCNC: 9 MG/DL (ref 8.4–10.2)
CHLORIDE SERPL-SCNC: 103 MEQ/L (ref 98–107)
CREAT SERPL-SCNC: 3.99 MG/DL (ref 0.57–1.11)
EOSINOPHIL # BLD: 0.5 K/MM3 (ref 0–0.7)
EOSINOPHIL NFR BLD: 6.6 % (ref 0–4)
ERYTHROCYTE [DISTWIDTH] IN BLOOD BY AUTOMATED COUNT: 14.7 % (ref 11.5–14.5)
GLUCOSE SERPL-MCNC: 117 MG/DL (ref 70–99)
GRANULOCYTES # BLD AUTO: 64.2 % (ref 42.2–75.2)
HCT VFR BLD AUTO: 38.7 % (ref 37–47)
HGB BLD-MCNC: 12.1 G/DL (ref 12.5–16)
INR BLD: 1 (ref 0.8–3)
LYMPHOCYTES # BLD: 1.4 K/MM3 (ref 1.2–3.4)
LYMPHOCYTES NFR BLD: 17.9 % (ref 20–51)
MAGNESIUM SERPL-MCNC: 1.8 MG/DL (ref 1.6–2.6)
MCH RBC QN AUTO: 31 PG (ref 27–31)
MCHC RBC AUTO-ENTMCNC: 31 G/DL (ref 33–37)
MCV RBC AUTO: 100 FL (ref 80–100)
MONOCYTES # BLD: 0.8 K/MM3 (ref 0.1–0.6)
MONOCYTES NFR BLD AUTO: 10.4 % (ref 1.7–9.3)
NEUTROPHILS # BLD: 5 K/MM3 (ref 1.4–6.5)
PLATELET # BLD AUTO: 214 K/MM3 (ref 130–400)
PMV BLD AUTO: 10.1 FL (ref 7.4–10.4)
POTASSIUM SERPL-SCNC: 4.5 MEQ/L (ref 3.5–4.5)
PROT SERPL-MCNC: 7.3 G/DL (ref 6.2–8.1)
PROTHROMBIN TIME: 10.6 SECONDS (ref 9.7–12.8)
RBC # BLD AUTO: 3.88 M/MM3 (ref 4.1–5.3)
SODIUM SERPL-SCNC: 140 MEQ/L (ref 136–145)
TROPONIN I SERPL-MCNC: 0.03 NG/ML (ref 0–0.03)

## (undated) DEVICE — HYBRID TUBING/CAP SET FOR OLYMPUS® SCOPES: Brand: ERBE

## (undated) DEVICE — VLV SXN AIR/H2O ORCAPOD3 1P/U STRL

## (undated) DEVICE — ENDOSCOPY PORT CONNECTOR FOR OLYMPUS® SCOPES: Brand: ERBE

## (undated) DEVICE — Device